# Patient Record
Sex: FEMALE | Race: ASIAN | NOT HISPANIC OR LATINO | Employment: FULL TIME | URBAN - METROPOLITAN AREA
[De-identification: names, ages, dates, MRNs, and addresses within clinical notes are randomized per-mention and may not be internally consistent; named-entity substitution may affect disease eponyms.]

---

## 2017-01-09 NOTE — PROGRESS NOTES
SUBJECTIVE:     CC: Katrina Barry is an 36 year old woman who presents for preventive health visit.     Healthy Habits:    Do you get at least three servings of calcium containing foods daily (dairy, green leafy vegetables, etc.)? yes    Amount of exercise or daily activities, outside of work:     Problems taking medications regularly not applicable    Medication side effects: No    Have you had an eye exam in the past two years? yes    Do you see a dentist twice per year? yes    Do you have sleep apnea, excessive snoring or daytime drowsiness?no    Pap smear done on this date: 4/2016 (approximately), by this group: in [place], results were negative.   Eye exam with ophthalmology on this date: 4/2016    [place] ago.    About a month ago had some lower abdominal pain and vaginal d/c.  Took an ABX (pcn?) that she had left from [place] and tylenol and felt better.  Now is having discharge again.  Also has chronic ear infections-- usually worse in right ear.  Water from shower will irritate symptoms. Has been ongoing since childhood.    PROBLEMS TO ADD ON...    Today's PHQ-2 Score:   PHQ-2 ( 1999 Pfizer) 1/10/2017   Q1: Little interest or pleasure in doing things 0   Q2: Feeling down, depressed or hopeless 0   PHQ-2 Score 0       Abuse: Current or Past(Physical, Sexual or Emotional)- No  Do you feel safe in your environment - Yes    Social History   Substance Use Topics     Smoking status: Never Smoker      Smokeless tobacco: Never Used     Alcohol Use: No     The patient does not drink >3 drinks per day nor >7 drinks per week.    No results for input(s): CHOL, HDL, LDL, TRIG, CHOLHDLRATIO, NHDL in the last 74092 hours.    Reviewed orders with patient.  Reviewed health maintenance and updated orders accordingly - Yes    Mammo Decision Support:  Mammogram not appropriate for this patient based on age.    Pertinent mammograms are reviewed under the imaging tab.  History of abnormal Pap smear: NO - age 30-  "65 PAP every 3 years recommended  All Histories reviewed and updated in Epic.      ROS:  C: NEGATIVE for fever, chills, change in weight  I: NEGATIVE for worrisome rashes, moles or lesions  E: NEGATIVE for vision changes or irritation  ENT: NEGATIVE for ear, mouth and throat problems  R: NEGATIVE for significant cough or SOB  B: NEGATIVE for masses, tenderness or discharge  CV: NEGATIVE for chest pain, palpitations or peripheral edema  GI: NEGATIVE for nausea, abdominal pain, heartburn, or change in bowel habits   female: vaginal discharge   M: NEGATIVE for significant arthralgias or myalgia  N: NEGATIVE for weakness, dizziness or paresthesias  P: NEGATIVE for changes in mood or affect    Problem list, Medication list, Allergies, and Medical/Social/Surgical histories reviewed in [facility] and updated as appropriate.  OBJECTIVE:     /72 mmHg  Pulse 68  Temp(Src) 98.3  F (36.8  C) (Oral)  Resp 16  Ht 5' 0.75\" (1.543 m)  Wt 116 lb (52.617 kg)  BMI 22.10 kg/m2  LMP 01/04/2017 (Exact Date)  Breastfeeding? No  EXAM:  GENERAL: healthy, alert and no distress  EYES: Eyes grossly normal to inspection, PERRL and conjunctivae and sclerae normal  HENT: normal cephalic/atraumatic, ear canals and TM's dull, nasal mucosa edematous, with enlarged turbinates bilateral , oropharynx clear and oral mucous membranes moist  NECK: no adenopathy, no asymmetry, masses, or scars and thyroid normal to palpation  RESP: lungs clear to auscultation - no rales, rhonchi or wheezes  BREAST: normal without masses, tenderness or nipple discharge and no palpable axillary masses or adenopathy  CV: regular rate and rhythm, normal S1 S2, no S3 or S4, no murmur, click or rub, no peripheral edema and peripheral pulses strong  ABDOMEN: soft, nontender, no hepatosplenomegaly, no masses and bowel sounds normal   (female): normal female external genitalia, normal urethral meatus, vaginal mucosa pink, moist, well rugated, and normal " "cervix/adnexa/uterus without masses-- yellow discharge present  MS: no gross musculoskeletal defects noted, no edema  SKIN: no suspicious lesions or rashes  NEURO: Normal strength and tone, mentation intact and speech normal  PSYCH: mentation appears normal, affect normal/bright    ASSESSMENT/PLAN:     1. Routine general medical examination at a health care facility    - LIPID REFLEX TO DIRECT LDL PANEL  - Basic metabolic panel  - TSH with free T4 reflex  - CBC with platelets    2. Cervical cancer screening    - Pap imaged thin layer screen with HPV - recommended age 30 - 65  - HPV High Risk Types DNA Cervical    3. Vaginal discharge    - Wet prep    4. Eustachian tube dysfunction, bilateral    - fluticasone (FLONASE) 50 MCG/ACT spray; Spray 1-2 sprays into both nostrils daily  Dispense: 1 Bottle; Refill: 11    5. Seasonal allergic rhinitis due to other allergic trigger    - cetirizine (ZYRTEC) 10 MG tablet; Take 1 tablet (10 mg) by mouth every evening  Dispense: 90 tablet; Refill: 3    COUNSELING:   Reviewed preventive health counseling, as reflected in patient instructions         reports that she has never smoked. She has never used smokeless tobacco.    Estimated body mass index is 22.1 kg/(m^2) as calculated from the following:    Height as of this encounter: 5' 0.75\" (1.543 m).    Weight as of this encounter: 116 lb (52.617 kg).       Counseling Resources:  ATP IV Guidelines  Pooled Cohorts Equation Calculator  Breast Cancer Risk Calculator  FRAX Risk Assessment  ICSI Preventive Guidelines  Dietary Guidelines for Americans, 2010  USDA's MyPlate  ASA Prophylaxis  Lung CA Screening    Roe Gibson PA-C  [facility]  "

## 2017-01-09 NOTE — PATIENT INSTRUCTIONS

## 2017-01-10 ENCOUNTER — OFFICE VISIT (OUTPATIENT)
Dept: FAMILY MEDICINE | Facility: CLINIC | Age: 37
End: 2017-01-10
Payer: COMMERCIAL

## 2017-01-10 VITALS
WEIGHT: 116 LBS | HEIGHT: 61 IN | HEART RATE: 68 BPM | BODY MASS INDEX: 21.9 KG/M2 | RESPIRATION RATE: 16 BRPM | TEMPERATURE: 98.3 F | SYSTOLIC BLOOD PRESSURE: 108 MMHG | DIASTOLIC BLOOD PRESSURE: 72 MMHG

## 2017-01-10 DIAGNOSIS — H69.93 EUSTACHIAN TUBE DYSFUNCTION, BILATERAL: ICD-10-CM

## 2017-01-10 DIAGNOSIS — Z00.00 ROUTINE GENERAL MEDICAL EXAMINATION AT A HEALTH CARE FACILITY: Primary | ICD-10-CM

## 2017-01-10 DIAGNOSIS — Z12.4 CERVICAL CANCER SCREENING: ICD-10-CM

## 2017-01-10 DIAGNOSIS — J30.89 SEASONAL ALLERGIC RHINITIS DUE TO OTHER ALLERGIC TRIGGER: ICD-10-CM

## 2017-01-10 DIAGNOSIS — N89.8 VAGINAL DISCHARGE: ICD-10-CM

## 2017-01-10 LAB
ERYTHROCYTE [DISTWIDTH] IN BLOOD BY AUTOMATED COUNT: 16 % (ref 10–15)
HCT VFR BLD AUTO: 36.9 % (ref 35–47)
HGB BLD-MCNC: 11.6 G/DL (ref 11.7–15.7)
MCH RBC QN AUTO: 20.6 PG (ref 26.5–33)
MCHC RBC AUTO-ENTMCNC: 31.4 G/DL (ref 31.5–36.5)
MCV RBC AUTO: 66 FL (ref 78–100)
MICRO REPORT STATUS: NORMAL
PLATELET # BLD AUTO: 238 10E9/L (ref 150–450)
RBC # BLD AUTO: 5.62 10E12/L (ref 3.8–5.2)
SPECIMEN SOURCE: NORMAL
WBC # BLD AUTO: 4.8 10E9/L (ref 4–11)
WET PREP SPEC: NORMAL

## 2017-01-10 PROCEDURE — 87624 HPV HI-RISK TYP POOLED RSLT: CPT | Performed by: PHYSICIAN ASSISTANT

## 2017-01-10 PROCEDURE — 85027 COMPLETE CBC AUTOMATED: CPT | Performed by: PHYSICIAN ASSISTANT

## 2017-01-10 PROCEDURE — 87210 SMEAR WET MOUNT SALINE/INK: CPT | Performed by: PHYSICIAN ASSISTANT

## 2017-01-10 PROCEDURE — 80061 LIPID PANEL: CPT | Performed by: PHYSICIAN ASSISTANT

## 2017-01-10 PROCEDURE — G0145 SCR C/V CYTO,THINLAYER,RESCR: HCPCS | Performed by: PHYSICIAN ASSISTANT

## 2017-01-10 PROCEDURE — 36415 COLL VENOUS BLD VENIPUNCTURE: CPT | Performed by: PHYSICIAN ASSISTANT

## 2017-01-10 PROCEDURE — 84443 ASSAY THYROID STIM HORMONE: CPT | Performed by: PHYSICIAN ASSISTANT

## 2017-01-10 PROCEDURE — 80048 BASIC METABOLIC PNL TOTAL CA: CPT | Performed by: PHYSICIAN ASSISTANT

## 2017-01-10 PROCEDURE — 99385 PREV VISIT NEW AGE 18-39: CPT | Performed by: PHYSICIAN ASSISTANT

## 2017-01-10 RX ORDER — FLUTICASONE PROPIONATE 50 MCG
1-2 SPRAY, SUSPENSION (ML) NASAL DAILY
Qty: 1 BOTTLE | Refills: 11 | Status: SHIPPED | OUTPATIENT
Start: 2017-01-10 | End: 2018-01-05

## 2017-01-10 RX ORDER — CETIRIZINE HYDROCHLORIDE 10 MG/1
10 TABLET ORAL EVERY EVENING
Qty: 90 TABLET | Refills: 3 | Status: SHIPPED | OUTPATIENT
Start: 2017-01-10 | End: 2018-01-05

## 2017-01-10 NOTE — Clinical Note
[address], [address]  [facility] [address]  [phone]      January 20, 2017    Katrina Barry  [address]  [facility] [address]    Dear Katrina,  We are happy to inform you that your PAP smear result from 01/10/17 is normal.  We are now able to do a follow up test on PAP smears. The DNA test is for HPV (Human Papilloma Virus). Cervical cancer is closely linked with certain types of HPV. Your result showed no evidence of high risk HPV.  Therefore we recommend you return in 3 years for your next pap smear.  You will still need to return to the clinic every year for an annual exam and other preventive tests.  Please contact the clinic with any questions.  Sincerely,  Roe Gibson PA-C/kostas

## 2017-01-10 NOTE — MR AVS SNAPSHOT
After Visit Summary   1/10/2017    Katrina Barry    MRN: [id number]           Patient Information     Date Of Birth          1980        Visit Information        Provider Department      1/10/2017 8:45 AM Roe Gibson PA-C; [employer] [facility]        Today's Diagnoses     Routine general medical examination at a health care facility    -  1     Cervical cancer screening         Vaginal discharge         Eustachian tube dysfunction, bilateral         Seasonal allergic rhinitis due to other allergic trigger           Care Instructions      Preventive Health Recommendations  Female Ages 26 - 39  Yearly exam:   See your health care provider every year in order to    Review health changes.     Discuss preventive care.      Review your medicines if you your doctor has prescribed any.    Until age 30: Get a Pap test every three years (more often if you have had an abnormal result).    After age 30: Talk to your doctor about whether you should have a Pap test every 3 years or have a Pap test with HPV screening every 5 years.   You do not need a Pap test if your uterus was removed (hysterectomy) and you have not had cancer.  You should be tested each year for STDs (sexually transmitted diseases), if you're at risk.   Talk to your provider about how often to have your cholesterol checked.  If you are at risk for diabetes, you should have a diabetes test (fasting glucose).  Shots: Get a flu shot each year. Get a tetanus shot every 10 years.   Nutrition:     Eat at least 5 servings of fruits and vegetables each day.    Eat whole-grain bread, whole-wheat pasta and brown rice instead of white grains and rice.    Talk to your provider about Calcium and Vitamin D.     Lifestyle    Exercise at least 150 minutes a week (30 minutes a day, 5 days of the week). This will help you control your weight and prevent disease.    Limit alcohol to one drink per day.    No smoking.  "    Wear sunscreen to prevent skin cancer.    See your dentist every six months for an exam and cleaning.          Eucerin- unscented lotion  For very dry skin- Aquphor (in tub with blue top)        Follow-ups after your visit        Follow-up notes from your care team     Return in about 1 year (around 1/10/2018) for Physical Exam.      Who to contact     If you have questions or need follow up information about today's clinic visit or your schedule please contact [facility] directly at [phone].  Normal or non-critical lab and imaging results will be communicated to you by KeepFuhart, letter or phone within 4 business days after the clinic has received the results. If you do not hear from us within 7 days, please contact the clinic through mktgt or phone. If you have a critical or abnormal lab result, we will notify you by phone as soon as possible.  Submit refill requests through [employer] or call your pharmacy and they will forward the refill request to us. Please allow 3 business days for your refill to be completed.          Additional Information About Your Visit        [employer] Information     [employer] lets you send messages to your doctor, view your test results, renew your prescriptions, schedule appointments and more. To sign up, go to [email or web address] . Click on \"Log in\" on the left side of the screen, which will take you to the Welcome page. Then click on \"Sign up Now\" on the right side of the page.     You will be asked to enter the access code listed below, as well as some personal information. Please follow the directions to create your username and password.     Your access code is: [email or web address]  Expires: 4/10/2017  9:56 AM     Your access code will  in 90 days. If you need help or a new code, please call your [facility] or [phone].        Care EveryWhere ID     This is your Care EveryWhere ID. This could be used by other organizations to access your [place] " "medical records  [id number]        Your Vitals Were     Pulse Temperature Respirations    68 98.3  F (36.8  C) (Oral) 16    Height BMI (Body Mass Index) Last Period    5' 0.75\" (1.543 m) 22.10 kg/m2 01/04/2017 (Exact Date)    Breastfeeding?          No         Blood Pressure from Last 3 Encounters:   01/10/17 108/72    Weight from Last 3 Encounters:   01/10/17 116 lb (52.617 kg)              We Performed the Following     Basic metabolic panel     CBC with platelets     HPV High Risk Types DNA Cervical     LIPID REFLEX TO DIRECT LDL PANEL     Pap imaged thin layer screen with HPV - recommended age 30 - 65     TSH with free T4 reflex     Wet prep          Today's Medication Changes          These changes are accurate as of: 1/10/17  9:56 AM.  If you have any questions, ask your nurse or doctor.               Start taking these medicines.        Dose/Directions    cetirizine 10 MG tablet   Commonly known as:  zyrTEC   Used for:  Seasonal allergic rhinitis due to other allergic trigger   Started by:  Roe Gibson PA-C        Dose:  10 mg   Take 1 tablet (10 mg) by mouth every evening   Quantity:  90 tablet   Refills:  3       fluticasone 50 MCG/ACT spray   Commonly known as:  FLONASE   Used for:  Eustachian tube dysfunction, bilateral   Started by:  Roe Gibson PA-C        Dose:  1-2 spray   Spray 1-2 sprays into both nostrils daily   Quantity:  1 Bottle   Refills:  11            Where to get your medicines      These medications were sent to [facility]/pharmacy #[id number] - [place], [place] - [address]  [address]   [address] [facility] [address]     Phone:  [phone]    - cetirizine 10 MG tablet  - fluticasone 50 MCG/ACT spray             Primary Care Provider Office Phone # Fax #    Roe Gibson PA-C [phone]       [facility] [id number] [facility] [address]        Thank you!     Thank you for choosing [facility]  for your care. " Our goal is always to provide you with excellent care. Hearing back from our patients is one way we can continue to improve our services. Please take a few minutes to complete the written survey that you may receive in the mail after your visit with us. Thank you!             Your Updated Medication List - Protect others around you: Learn how to safely use, store and throw away your medicines at www.disposemymeds.org.          This list is accurate as of: 1/10/17  9:56 AM.  Always use your most recent med list.                   Brand Name Dispense Instructions for use    cetirizine 10 MG tablet    zyrTEC    90 tablet    Take 1 tablet (10 mg) by mouth every evening       fluticasone 50 MCG/ACT spray    FLONASE    1 Bottle    Spray 1-2 sprays into both nostrils daily

## 2017-01-11 LAB
ANION GAP SERPL CALCULATED.3IONS-SCNC: 6 MMOL/L (ref 3–14)
BUN SERPL-MCNC: 17 MG/DL (ref 7–30)
CALCIUM SERPL-MCNC: 9 MG/DL (ref 8.5–10.1)
CHLORIDE SERPL-SCNC: 108 MMOL/L (ref 94–109)
CHOLEST SERPL-MCNC: 341 MG/DL
CO2 SERPL-SCNC: 27 MMOL/L (ref 20–32)
CREAT SERPL-MCNC: 0.51 MG/DL (ref 0.52–1.04)
GFR SERPL CREATININE-BSD FRML MDRD: ABNORMAL ML/MIN/1.7M2
GLUCOSE SERPL-MCNC: 101 MG/DL (ref 70–99)
HDLC SERPL-MCNC: 51 MG/DL
LDLC SERPL CALC-MCNC: 276 MG/DL
NONHDLC SERPL-MCNC: 290 MG/DL
POTASSIUM SERPL-SCNC: 4.2 MMOL/L (ref 3.4–5.3)
SODIUM SERPL-SCNC: 141 MMOL/L (ref 133–144)
TRIGL SERPL-MCNC: 69 MG/DL
TSH SERPL DL<=0.005 MIU/L-ACNC: 1.19 MU/L (ref 0.4–4)

## 2017-01-13 LAB
COPATH REPORT: NORMAL
PAP: NORMAL

## 2017-01-19 LAB
FINAL DIAGNOSIS: NORMAL
HPV HR 12 DNA CVX QL NAA+PROBE: NEGATIVE
HPV16 DNA SPEC QL NAA+PROBE: NEGATIVE
HPV18 DNA SPEC QL NAA+PROBE: NEGATIVE
SPECIMEN DESCRIPTION: NORMAL

## 2017-01-25 ENCOUNTER — TELEPHONE (OUTPATIENT)
Dept: FAMILY MEDICINE | Facility: CLINIC | Age: 37
End: 2017-01-25

## 2017-01-25 NOTE — TELEPHONE ENCOUNTER
Notes Recorded by Roe Gibson PA-C on 1/24/2017 at 3:50 PM  Can we call to see about having her come back to discuss labs?  Cholesterol is very high.  She is quite young so not sure medication is desirable for her but if family history is strong for heart disease we may need to address this.    I called patient's  Shantelle ([phone]) and left a detailed message on her voice mail to call the clinic back and schedule a follow up appointment with Roe to go over her lab results.    Shannon Wynn RN

## 2017-02-21 ENCOUNTER — TELEPHONE (OUTPATIENT)
Dept: FAMILY MEDICINE | Facility: CLINIC | Age: 37
End: 2017-02-21

## 2017-02-21 NOTE — TELEPHONE ENCOUNTER
Reason for call: Form   Our goal is to have forms completed within 72 hours, however some forms may require a visit or additional information.     Who is the form from? Patient  Where did the form come from? Patient or family brought in     What clinic location was the form placed at? [facility]  Where was the form placed? TC Desk  What number is listed as a contact on the form? none    Phone call message - patient request for a letter, form or note:     Date needed: To be addressed at next appt.  Patient will  at the clinic when completed  Has the patient signed a consent form for release of information? Not Applicable    Additional comments: Spouse has same papers to be filled out     Type of letter, form or note: medical    Phone Number Pt can be reached at: Home number on file [phone] (home)  Best Time: any  Can we leave a detailed message on this number? YES     Demond Ortega

## 2017-02-24 ENCOUNTER — OFFICE VISIT (OUTPATIENT)
Dept: FAMILY MEDICINE | Facility: CLINIC | Age: 37
End: 2017-02-24
Payer: COMMERCIAL

## 2017-02-24 VITALS
TEMPERATURE: 98.6 F | BODY MASS INDEX: 22.48 KG/M2 | WEIGHT: 118 LBS | DIASTOLIC BLOOD PRESSURE: 70 MMHG | HEART RATE: 82 BPM | RESPIRATION RATE: 20 BRPM | OXYGEN SATURATION: 99 % | SYSTOLIC BLOOD PRESSURE: 96 MMHG

## 2017-02-24 DIAGNOSIS — E78.5 ELEVATED LIPIDS: Primary | ICD-10-CM

## 2017-02-24 DIAGNOSIS — Z23 ENCOUNTER FOR VACCINATION: ICD-10-CM

## 2017-02-24 DIAGNOSIS — Z23 NEED FOR PROPHYLACTIC VACCINATION AND INOCULATION AGAINST INFLUENZA: ICD-10-CM

## 2017-02-24 PROCEDURE — 90707 MMR VACCINE SC: CPT | Performed by: PHYSICIAN ASSISTANT

## 2017-02-24 PROCEDURE — 90686 IIV4 VACC NO PRSV 0.5 ML IM: CPT | Performed by: PHYSICIAN ASSISTANT

## 2017-02-24 PROCEDURE — 90471 IMMUNIZATION ADMIN: CPT | Performed by: PHYSICIAN ASSISTANT

## 2017-02-24 PROCEDURE — 90472 IMMUNIZATION ADMIN EACH ADD: CPT | Performed by: PHYSICIAN ASSISTANT

## 2017-02-24 PROCEDURE — 99213 OFFICE O/P EST LOW 20 MIN: CPT | Mod: 25 | Performed by: PHYSICIAN ASSISTANT

## 2017-02-24 PROCEDURE — 90716 VAR VACCINE LIVE SUBQ: CPT | Performed by: PHYSICIAN ASSISTANT

## 2017-02-24 PROCEDURE — 90632 HEPA VACCINE ADULT IM: CPT | Performed by: PHYSICIAN ASSISTANT

## 2017-02-24 NOTE — NURSING NOTE
"Chief Complaint   Patient presents with     Lab Result Notice       Initial BP 96/70 (BP Location: Right arm, Patient Position: Chair, Cuff Size: Adult Regular)  Pulse 82  Temp 98.6  F (37  C) (Oral)  Resp 20  Wt 118 lb (53.5 kg)  SpO2 99%  BMI 22.48 kg/m2 Estimated body mass index is 22.48 kg/(m^2) as calculated from the following:    Height as of 1/10/17: 5' 0.75\" (1.543 m).    Weight as of this encounter: 118 lb (53.5 kg).  Medication Reconciliation: complete   Antionette Palma MA      "

## 2017-02-24 NOTE — PROGRESS NOTES
SUBJECTIVE:                                                    Katrina Barry is a 36 year old female who presents to clinic today for the following health issues:      Patient here to discuss recent lab results    Hyperlipidemia Follow-Up      Rate your low fat/cholesterol diet?: fair    Taking statin?  No    Other lipid medications/supplements?:  none    Recent Labs   Lab Test  01/10/17   1001   CHOL  341*   HDL  51   LDL  276*   TRIG  69     Katrina is here for follow up on elevated lipids. Reports she had elevated lipids in [place], but was not on any medications. Back in [place] was watching diet and exercising. Reports that for the past six months or so she has not been monitoring her diet at all. Thinks a level of 190 back in [place], unsure if total or LDL. She is not planning on having any more children. Does have IUD.        Additional problems:  Would also like to get vaccines updated.     Problem list and histories reviewed & adjusted, as indicated.  Additional history: as documented    BP Readings from Last 3 Encounters:   02/24/17 96/70   01/10/17 108/72    Wt Readings from Last 3 Encounters:   02/24/17 53.5 kg (118 lb)   01/10/17 52.6 kg (116 lb)                Labs reviewed in EPIC  Problem list, Medication list, Allergies, and Medical/Social/Surgical histories reviewed in [employer] and updated as appropriate.    ROS:  Constitutional, HEENT, cardiovascular, pulmonary, gi and gu systems are negative, except as otherwise noted.    This document serves as a record of the services and decisions personally performed and made by Roe Gibson PA-C. It was created on her behalf by Katya Bailey, a trained medical scribe. The creation of this document is based the provider's statements to the medical scribe.  Katya Bailey February 24, 2017 2:07 PM    OBJECTIVE:                                                    BP 96/70 (BP Location: Right arm, Patient Position: Chair, Cuff Size: Adult Regular)  Pulse 82  Temp 98.6   F (37  C) (Oral)  Resp 20  Wt 53.5 kg (118 lb)  SpO2 99%  BMI 22.48 kg/m2  Body mass index is 22.48 kg/(m^2).  GENERAL: healthy, alert and no distress  MS: no gross musculoskeletal defects noted, no edema  SKIN: no suspicious lesions or rashes  NEURO: Normal strength and tone, mentation intact and speech normal  PSYCH: mentation appears normal, affect normal/bright    Diagnostic Test Results:  none      ASSESSMENT/PLAN:                                                    1. Elevated lipids  Options discussed with patient. Will try to focus on a healthy diet for next six months and re-test. Also discussed that genetics could also be playing a role. Discussed options of medications, will re-address in six months. Discussed patient can not get pregnant while using statin medications but zetia could be an option.  She does have an IUD for birth control.  Not planning on children at this exact time.  Lipid future lab placed to have completed a few days before next appointment. Follow up in six months.   - Lipid Profile with reflex to direct LDL; Future  - NUTRITION REFERRAL    2. Encounter for vaccination  Update of vaccines. Recently moved to [place] from [place].   - HEPA/HEPB VACCINE ADULT IM  - FLU VACCINE, 3 YRS +, IM  - varicella virus vaccine, live, (VARIVAX) 1350 PFU/0.5ML INJ injection; Inject 0.5 mLs Subcutaneous once for 1 dose  Dispense: 0.5 mL; Refill: 0  - MMR VIRUS IMMUNIZATION, SUBCUT    The information in this document, created by the medical scribe for me, accurately reflects the services I personally performed and the decisions made by me. I have reviewed and approved this document for accuracy prior to leaving the patient care area.  2:07 PM 2/24/2017          Roe Gibson PA-C  [facility]    Injectable Influenza Immunization Documentation    1.  Is the person to be vaccinated sick today?  No    2. Does the person to be vaccinated have an allergy to eggs or to a  component of the vaccine?  No    3. Has the person to be vaccinated today ever had a serious reaction to influenza vaccine in the past?  No    4. Has the person to be vaccinated ever had Guillain-[place] syndrome?  No     Form completed by Antionette Palma MA

## 2017-02-24 NOTE — MR AVS SNAPSHOT
"              After Visit Summary   2/24/2017    Katrina Barry    MRN: [id number]           Patient Information     Date Of Birth          1980        Visit Information        Provider Department      2/24/2017 1:30 PM Roe Gibson PA-C; [employer] [facility]        Today's Diagnoses     Elevated lipids    -  1    Encounter for vaccination          Care Instructions      Ki?m soát l??ng Cholesterol c?a quý v?  Cholesterol là m?t ch?t gi?ng sáp.Nó di reji?n radha máu c?a quý v? thông guy các m?ch máu.Khi quý v? có hàm l??ng cholesterol oden, nó hình thành nên các vách radha m?ch máu.?i?u này khi?n các m?ch máu h?p h?n.L?u l??ng máu gi?m. Khi ?ó, r?i ro quý v? b? ?au josiah (heart attack) ho?c ??t qu? (stroke) s? l?n h?n.  Cholesterol t?t và x?u  Lipit là ch?t béo.Ph?n l?n thành ph?n c?a máu là n??c.Ch?t béo và n??c không hòa tan. Vì th?, c? th? c?a chúng ta c?n các lo?i lipoprotein (lipit bên radha m?t l?p v? protein) ?? abdon lipit. L?p v? protein abdon yjaaira lipit ?i guy dòng máu. Có bee lo?i lipoprotein chính:    Lipoprotein m?t ?? th?p (low-density lipoprotein, LDL) còn ???c bi?t ??n nh? là \"cholesterol x?u\". Nó ch? y?u abdon yajaira cholesterol. Nó d?n l??ng cholesterol này vào các t? bào c? th?. Colesterol d? th?a t? LDL s? hình thành các vách ??ng m?ch. ?i?u này làm t?ng r?i ro quý v? m?c b?nh josiah ho?c b? ??t qu?.    Lipoprotein m?t ?? cap (high-density lipoprotein, HDL) còn ???c bi?t ??n nh? là \"cholesterol t?t\". Nó ch? y?u là m?t l?p v? protein. L?p v? t?p h?p l??ng cholesterol d? th?a mà các LDL ?? l?i phía chetan trên các vách radha m?ch máu. ?ó là lý do t?i caroline m?c cholesterol HDL oden có th? gi?m r?i ro m?c b?nh josiah và ??t qu? c?a quý v?.  Ki?m soát m?c cholesterol  Cholesterol toàn ph?n laura g?m cholesterol LDL và HDL c?ng nh? các lo?i ch?t béo khác radha dòng máu. N?u cholesterol toàn ph?n c?a quý v? oden, hãy làm yajaira các b??c bên d??i ?? giúp gi?m m?c cholesterol toàn " ph?n c?a quý v?.    ?n ít ch?t béo không có l?i cho s?c kh?e    Gi?m l??ng ch?t béo ba?o ho?a và ch?t béo chuyê?n ho?a (còn g?i là ch?t béo ?ã hi?ro hóa) b??ng ca?ch l??a cho?n th?t n?c, s?a ít béo và s? d?ng các lo?i d?u thay cho ch?t béo da?ng r??n. Ha?n chê? ca?c sa?n phâ?m n???ng, th?t ch? bi?n và các lo?i th?c ph?m chiên. M?t ch? ?? ?n giàu hàm l??ng các lo?i ch?t béo này làm t?ng l??ng cholesterol x?u c?a quý v?. Ch? gi?m th?c ?n ch?a cholesterol không thôi là ch?a ??.    Hãy ?n anderson?ng 2 b?a cá m?i tu?n. H?u h?t cá ??u ch?a các lo?i axit béo omega-3 (omega-3 fatty acid). Các lo?i axit này giúp gi?m l??ng cholesterol radha máu.    ?n emani?u th?c ?n toàn kathryn b?t ho?c s?i có th? hòa tan (ch?ng h?n nh? cám y?n m?ch). Nh?ng lo?i th?c ?n này giúp h? th?p l??ng cholesterol toàn b?.    N?ng t?p luy?n    Ch?n m?t ho?t ??ng mà quý v? yêu thích. ?i b?, b?i và ?i xe ??p là m?t vài cách t?t ?? t?p luy?n.    B?t ??u ? m?c ?? mà quý v? c?m th?y tho?i mái. T?ng th?i angely và nh?p ?? c?a quý v? m?t chút chetan m?i tu?n.    Patricia?t ?ô?ng thê? châ?t t?i ?a 40 phút v??i c???ng ?ô? t?? v??a ?ê?n oden tô?i thiê?u 3 ?ê?n 4 natalia?y mô?i tuâ?n.    Hãy nh? r?ng có t?p luy?n còn h?n không.    N?u quý v? ch?a t?ng t?p luy?n ??u ??n, hãy b?t ??u m?t cách ch?m ch?m. Ki?m tra v?i bác s? c?a quý v? ?? ch?c ch?n r?ng k? ho?ch t?p luy?n phù h?p ??i v?i quý v?.    B? hút thu?c: B? hút thu?c có th? c?i thi?n l??ng lipit c?a quý v?. Nó c?ng làm gi?m r?i ro m?c b?nh josiah và ??t qu? c?a quý v?.    Qu?n lý cân n??ng. N?u yesi? vi? ?ang th?a cân ho?c béo phì, nhà cung c?p ch?m sóc s?c kh?e c?a yesi? vi? s? làm vi?c v?i yesi? vi? ?? gi?m cân và gi?m ch? s? BMI (ch? s? kh?i c? th?) cu?a yesi? vi? ??n m?t m?c ?? bình th??ng ho?c g?n v??i bình th??ng. Thay ??i ch? ?? ?n u?ng và t?ng ho?t ??ng th? ch?t có th? h??u i?ch.      U?ng thu?c yajaira ch? d?n: Có emani?u ng??i ???c nh?n thu?c ?? ??a m?c LDL c?a h? v? ng??ng an toàn. Thu?c dùng ?? gi?m m?c cholesterol là lo?i hi?u  qu? và an toàn. (Nh?ng u?ng thu?c không ph?i là hình th?c thay th? cho t?p luy?n ho?c tuân th? ch? ?? ?n kiêng c?a quý v?!) Bác s? c?a quý v? có th? cho quý v? bi?t li?u m?t lo?i thu?c h? cholesterol nào ?ó có l?i cho quý v? hay không.    [id number] [employer] [facility]. [address] [place] [address]. All rights reserved. This information is not intended as a substitute for professional medical care. Always follow your healthcare professional's instructions.            Keeping Healthy: Taking Care of Your Cholesterol:       Cholesterol is a fatty substance in your body. Cholesterol can be both helpful and harmful to your body. On the good side, it helps build the hormones and nerve cells your body needs. But when you have too much cholesterol, the walls of your blood vessels can thicken. This can cause heart attacks and strokes.     Measuring Cholesterol   When you get your cholesterol checked, your health care provider will tell you how high your HDL (Good) and your LDL (Bad) cholesterol is:     HDL helps prevent heart disease. It helps your body get rid of cholesterol.   LDL leaves fat on the inside of the blood vessels. When you have too much LDL, you have a higher chance of heart disease.   It's good to have high HDL and low LDL.     If your HDL is   45 or above, that is healthy.   35 to 44, it is a little too low.   35 or below, that is not healthy.     If your LDL is:   130 or below, that is healthy.   130 to 159, that is a little too high.   160 or above, that is not healthy.     Keeping Your Cholesterol Low   Most of the time, you can take care of your cholesterol by eating right and getting the exercise you need.     It's important to eat healthy foods to keep a healthy weight.   Overall watch your portion size--too much of any food whether it is a healthy food or not will cause weight gain and will increase your fats in your blood.  Eat fruits, vegetables, beans, and whole grains every day.    Eat less fat. Stay away from saturated fat, like that in butter and meat.   Use oils like sunflower, safflower, canola, olive, or corn. Stay away from palm or coconut oil.   Eat chicken, turkey, and fish instead of red meat.   It also helps to:     Check food labels for fat and cholesterol.   Take the skin off before you eat chicken or turkey.   Drink skim milk instead of whole milk.   Use low-fat yogurt or cottage cheese instead of sour cream.   Follow your health care provider's advice for exercise.     You may want to swim, jog, walk, or bicycle.   You should exercise at least 20 minutes everyday.   High cholesterol may be a problem in your family. Know your family history. Talk about it with your health care provider.     Remember, to take care of your cholesterol:     Eat healthy.   Exercise often.           Medlineplus= approved website to look up your health!!            Follow-ups after your visit        Additional Services     NUTRITION REFERRAL       Your provider has referred you to: [facility]: [facility] [phone]   [email or web address]    Please be aware that coverage of these services is subject to the terms and limitations of your health insurance plan.  Call member services at your health plan with any benefit or coverage questions.      Please bring the following with you to your appointment:    (1) This referral request  (2) Any documents given to you regarding this referral  (3) Any specific questions you have about diet and/or food choices                  Follow-up notes from your care team     Return in about 6 months (around 8/24/2017) for Lab Work.      Future tests that were ordered for you today     Open Future Orders        Priority Expected Expires Ordered    Lipid Profile with reflex to direct LDL Routine  2/24/2018 2/24/2017            Who to contact     If you have questions or need follow up information about today's clinic visit or  "your schedule please contact [facility] directly at [phone].  Normal or non-critical lab and imaging results will be communicated to you by MyChart, letter or phone within 4 business days after the clinic has received the results. If you do not hear from us within 7 days, please contact the clinic through [employer] or phone. If you have a critical or abnormal lab result, we will notify you by phone as soon as possible.  Submit refill requests through [employer] or call your pharmacy and they will forward the refill request to us. Please allow 3 business days for your refill to be completed.          Additional Information About Your Visit        [employer] Information     [employer] lets you send messages to your doctor, view your test results, renew your prescriptions, schedule appointments and more. To sign up, go to [email or web address] . Click on \"Log in\" on the left side of the screen, which will take you to the Welcome page. Then click on \"Sign up Now\" on the right side of the page.     You will be asked to enter the access code listed below, as well as some personal information. Please follow the directions to create your username and password.     Your access code is: [email or web address]  Expires: 4/10/2017  9:56 AM     Your access code will  in 90 days. If you need help or a new code, please call your [facility] clinic or [phone].        Care EveryWhere ID     This is your Care EveryWhere ID. This could be used by other organizations to access your [facility] medical records  [id number]        Your Vitals Were     Pulse Temperature Respirations Pulse Oximetry BMI (Body Mass Index)       82 98.6  F (37  C) (Oral) 20 99% 22.48 kg/m2        Blood Pressure from Last 3 Encounters:   17 96/70   01/10/17 108/72    Weight from Last 3 Encounters:   17 118 lb (53.5 kg)   01/10/17 116 lb (52.6 kg)              We Performed the Following     FLU VACCINE, 3 YRS +, IM     HEPA/HEPB VACCINE " ADULT IM     MMR VIRUS IMMUNIZATION, SUBCUT     NUTRITION REFERRAL          Today's Medication Changes          These changes are accurate as of: 2/24/17  2:41 PM.  If you have any questions, ask your nurse or doctor.               Start taking these medicines.        Dose/Directions    varicella virus vaccine (live) 1350 PFU/0.5ML Inj injection   Commonly known as:  VARIVAX   Used for:  Encounter for vaccination   Started by:  Roe Gibson PA-C        Dose:  0.5 mL   Inject 0.5 mLs Subcutaneous once for 1 dose   Quantity:  0.5 mL   Refills:  0            Where to get your medicines      These medications were sent to [facility]/pharmacy #[id number] - [place], [place] - [address]  [facility]  [address] [facility] [address]     Phone:  [phone]     varicella virus vaccine (live) 1350 PFU/0.5ML Inj injection                Primary Care Provider Office Phone # Fax #    Roe Gibson PA-C [phone]       [facility] [address] [facility] [address]        Thank you!     Thank you for choosing [facility]  for your care. Our goal is always to provide you with excellent care. Hearing back from our patients is one way we can continue to improve our services. Please take a few minutes to complete the written survey that you may receive in the mail after your visit with us. Thank you!             Your Updated Medication List - Protect others around you: Learn how to safely use, store and throw away your medicines at www.disposemymeds.org.          This list is accurate as of: 2/24/17  2:41 PM.  Always use your most recent med list.                   Brand Name Dispense Instructions for use    cetirizine 10 MG tablet    zyrTEC    90 tablet    Take 1 tablet (10 mg) by mouth every evening       fluticasone 50 MCG/ACT spray    FLONASE    1 Bottle    Spray 1-2 sprays into both nostrils daily       varicella virus vaccine (live) 1350 PFU/0.5ML Inj injection     VARIVAX    0.5 mL    Inject 0.5 mLs Subcutaneous once for 1 dose

## 2017-02-24 NOTE — PATIENT INSTRUCTIONS
"  Ki?m soát l??ng Cholesterol c?a quý v?  Cholesterol là m?t ch?t gi?ng sáp.Nó di reji?n radha máu c?a quý v? thông guy các m?ch máu.Khi quý v? có hàm l??ng cholesterol oden, nó hình thành nên các vách radha m?ch máu.?i?u này khi?n các m?ch máu h?p h?n.L?u l??ng máu gi?m. Khi ?ó, r?i ro quý v? b? ?au josiah (heart attack) ho?c ??t qu? (stroke) s? l?n h?n.  Cholesterol t?t và x?u  Lipit là ch?t béo.Ph?n l?n thành ph?n c?a máu là n??c.Ch?t béo và n??c không hòa tan. Vì th?, c? th? c?a jazlyn ta c?n các lo?i lipoprotein (lipit bên radha m?t l?p v? protein) ?? abdon lipit. L?p v? protein abdon yajaira lipit ?i guy dòng máu. Có bee lo?i lipoprotein chính:    Lipoprotein m?t ?? th?p (low-density lipoprotein, LDL) còn ???c bi?t ??n nh? là \"cholesterol x?u\". Nó ch? y?u abdon yajaira cholesterol. Nó d?n l??ng cholesterol này vào các t? bào c? th?. Colesterol d? th?a t? LDL s? hình thành các vách ??ng m?ch. ?i?u này làm t?ng r?i ro quý v? m?c b?nh josiah ho?c b? ??t qu?.    Lipoprotein m?t ?? cap (high-density lipoprotein, HDL) còn ???c bi?t ??n nh? là \"cholesterol t?t\". Nó ch? y?u là m?t l?p v? protein. L?p v? t?p h?p l??ng cholesterol d? th?a mà các LDL ?? l?i phía chetan trên các vách radha m?ch máu. ?ó là lý do t?i caroline m?c cholesterol HDL oden có th? gi?m r?i ro m?c b?nh josiah và ??t qu? c?a quý v?.  Ki?m soát m?c cholesterol  Cholesterol toàn ph?n laura g?m cholesterol LDL và HDL c?ng nh? các lo?i ch?t béo khác radha dòng máu. N?u cholesterol toàn ph?n c?a quý v? oden, hãy làm yajaira các b??c bên d??i ?? giúp gi?m m?c cholesterol toàn ph?n c?a quý v?.    ?n ít ch?t béo không có l?i cho s?c kh?e    Gi?m l??ng ch?t béo ba?o ho?a và ch?t béo chuyê?n ho?a (còn g?i là ch?t béo ?ã hi?ro hóa) b??ng ca?ch l??a cho?n th?t n?c, s?a ít béo và s? d?ng các lo?i d?u thay cho ch?t béo da?ng r??n. Ha?n chê? ca?c sa?n phâ?m n???ng, th?t ch? bi?n và các lo?i th?c ph?m srikanth. M?t ch? ?? ?n giàu hàm l??ng các lo?i ch?t béo này làm t?ng l??ng cholesterol x?u c?a quý " v?. Ch? gi?m th?c ?n ch?a cholesterol không thôi là ch?a ??.    Hãy ?n anderson?ng 2 b?a cá m?i tu?n. H?u h?t cá ??u ch?a các lo?i axit béo omega-3 (omega-3 fatty acid). Các lo?i axit này giúp gi?m l??ng cholesterol radha máu.    ?n emani?u th?c ?n toàn kathryn b?t ho?c s?i có th? hòa tan (ch?ng h?n nh? cám y?n m?ch). Nh?ng lo?i th?c ?n này giúp h? th?p l??ng cholesterol toàn b?.    N?ng t?p luy?n    Ch?n m?t ho?t ??ng mà quý v? yêu thích. ?i b?, b?i và ?i xe ??p là m?t vài cách t?t ?? t?p luy?n.    B?t ??u ? m?c ?? mà quý v? c?m th?y tho?i mái. T?ng th?i angely và nh?p ?? c?a quý v? m?t chút chetan m?i tu?n.    Patricia?t ?ô?ng thê? châ?t t?i ?a 40 phút v??i c???ng ?ô? t?? v??a ?ê?n oden tô?i thiê?u 3 ?ê?n 4 natalia?y mô?i tuâ?n.    Hãy nh? r?ng có t?p luy?n còn h?n không.    N?u quý v? ch?a t?ng t?p luy?n ??u ??n, hãy b?t ??u m?t cách ch?m ch?m. Ki?m tra v?i bác s? c?a quý v? ?? ch?c ch?n r?ng k? ho?ch t?p luy?n phù h?p ??i v?i quý v?.    B? hút thu?c: B? hút thu?c có th? c?i thi?n l??ng lipit c?a quý v?. Nó c?ng làm gi?m r?i ro m?c b?nh josiah và ??t qu? c?a quý v?.    Qu?n lý cân n??ng. N?u yesi? vi? ?ang th?a cân ho?c béo phì, nhà cung c?p ch?m sóc s?c kh?e c?a yesi? vi? s? làm vi?c v?i yesi? vi? ?? gi?m cân và gi?m ch? s? BMI (ch? s? kh?i c? th?) cu?a yesi? vi? ??n m?t m?c ?? bình th??ng ho?c g?n v??i bình th??ng. Thay ??i ch? ?? ?n u?ng và t?ng ho?t ??ng th? ch?t có th? h??u i?ch.      U?ng thu?c yajaira ch? d?n: Có emani?u ng??i ???c nh?n thu?c ?? ??a m?c LDL c?a h? v? ng??ng an toàn. Thu?c dùng ?? gi?m m?c cholesterol là lo?i hi?u qu? và an toàn. (Nh?ng u?ng thu?c không ph?i là hình th?c thay th? cho t?p luy?n ho?c tuân th? ch? ?? ?n kiêng c?a quý v?!) Bác s? c?a quý v? có th? cho quý v? bi?t li?u m?t lo?i thu?c h? cholesterol nào ?ó có l?i cho quý v? hay không.    [id number] [employer]. [address] [place] [address], [place] [address]. All rights reserved. This information is not intended as a substitute for professional medical care.  Always follow your healthcare professional's instructions.            Keeping Healthy: Taking Care of Your Cholesterol:       Cholesterol is a fatty substance in your body. Cholesterol can be both helpful and harmful to your body. On the good side, it helps build the hormones and nerve cells your body needs. But when you have too much cholesterol, the walls of your blood vessels can thicken. This can cause heart attacks and strokes.     Measuring Cholesterol   When you get your cholesterol checked, your health care provider will tell you how high your HDL (Good) and your LDL (Bad) cholesterol is:     HDL helps prevent heart disease. It helps your body get rid of cholesterol.   LDL leaves fat on the inside of the blood vessels. When you have too much LDL, you have a higher chance of heart disease.   It's good to have high HDL and low LDL.     If your HDL is   45 or above, that is healthy.   35 to 44, it is a little too low.   35 or below, that is not healthy.     If your LDL is:   130 or below, that is healthy.   130 to 159, that is a little too high.   160 or above, that is not healthy.     Keeping Your Cholesterol Low   Most of the time, you can take care of your cholesterol by eating right and getting the exercise you need.     It's important to eat healthy foods to keep a healthy weight.   Overall watch your portion size--too much of any food whether it is a healthy food or not will cause weight gain and will increase your fats in your blood.  Eat fruits, vegetables, beans, and whole grains every day.   Eat less fat. Stay away from saturated fat, like that in butter and meat.   Use oils like sunflower, safflower, canola, olive, or corn. Stay away from palm or coconut oil.   Eat chicken, turkey, and fish instead of red meat.   It also helps to:     Check food labels for fat and cholesterol.   Take the skin off before you eat chicken or turkey.   Drink skim milk instead of whole milk.   Use low-fat yogurt or  cottage cheese instead of sour cream.   Follow your health care provider's advice for exercise.     You may want to swim, jog, walk, or bicycle.   You should exercise at least 20 minutes everyday.   High cholesterol may be a problem in your family. Know your family history. Talk about it with your health care provider.     Remember, to take care of your cholesterol:     Eat healthy.   Exercise often.           Medlineplus= approved website to look up your health!!

## 2017-02-24 NOTE — NURSING NOTE
Screening Questionnaire for Adult Immunization    Are you sick today?   No   Do you have allergies to medications, food, a vaccine component or latex?   No   Have you ever had a serious reaction after receiving a vaccination?   No   Do you have a long-term health problem with heart disease, lung disease, asthma, kidney disease, metabolic disease (e.g. diabetes), anemia, or other blood disorder?   No   Do you have cancer, leukemia, HIV/AIDS, or any other immune system problem?   No   In the past 3 months, have you taken medications that affect  your immune system, such as prednisone, other steroids, or anticancer drugs; drugs for the treatment of rheumatoid arthritis, Crohn s disease, or psoriasis; or have you had radiation treatments?   No   Have you had a seizure, or a brain or other nervous system problem?   No   During the past year, have you received a transfusion of blood or blood     products, or been given immune (gamma) globulin or antiviral drug?   No   For women: Are you pregnant or is there a chance you could become        pregnant during the next month?   No   Have you received any vaccinations in the past 4 weeks?   No     Immunization questionnaire answers were all negative.      MNVFC doesn't apply on this patient    Per orders of RUPINDER Kirby, injection of MMR, Varicella, Hep A, Influenza given by Antionette Palma. Patient instructed to remain in clinic for 20 minutes afterwards, and to report any adverse reaction to me immediately.       Screening performed by Antionette Palma on 2/24/2017 at 2:54 PM.

## 2017-03-23 ENCOUNTER — OFFICE VISIT (OUTPATIENT)
Dept: NUTRITION | Facility: CLINIC | Age: 37
End: 2017-03-23
Attending: PHYSICIAN ASSISTANT
Payer: COMMERCIAL

## 2017-03-23 DIAGNOSIS — E78.00 HIGH BLOOD CHOLESTEROL: Primary | ICD-10-CM

## 2017-03-23 PROCEDURE — 97802 MEDICAL NUTRITION INDIV IN: CPT

## 2017-03-23 NOTE — MR AVS SNAPSHOT
After Visit Summary   3/23/2017    Katrina Barry    MRN: [id number]           Patient Information     Date Of Birth          1980        Visit Information        Provider Department      3/23/2017 2:15 PM [facility] [employer]; [place] NUTRITION RESOURCE [facility]        Today's Diagnoses     High blood cholesterol    -  1      Care Instructions    1.         Follow-ups after your visit        Your next 10 appointments already scheduled     Aug 21, 2017  8:00 AM CDT   LAB with FM LAB   [facility]    [address] [place] [address], [address] [address] [phone]           Patient must bring picture ID.  Patient should be prepared to give a urine specimen  Please do not eat 10-12 hours before your appointment if you are coming in fasting for labs on lipids, cholesterol, or glucose (sugar).  Pregnant women should follow their Care Team instructions. Water with medications is okay. Do not drink coffee or other fluids.   If you have concerns about taking  your medications, please ask at office or if scheduling via [employer], send a message by clicking on Secure Messaging, Message Your Care Team.            Aug 24, 2017  8:00 AM CDT   SHORT with Roe Gibson PA-C   [facility]    [address] [place] [address], [address] [place] [address] [phone]              Who to contact     If you have questions or need follow up information about today's clinic visit or your schedule please contact [facility] directly at [phone].  Normal or non-critical lab and imaging results will be communicated to you by MyChart, letter or phone within 4 business days after the clinic has received the results. If you do not hear from us within 7 days, please contact the clinic through MyChart or phone. If you have a critical or abnormal lab result, we will notify you by phone  "as soon as possible.  Submit refill requests through [facility] or call your pharmacy and they will forward the refill request to us. Please allow 3 business days for your refill to be completed.          Additional Information About Your Visit        [employer] Information     [facility] lets you send messages to your doctor, view your test results, renew your prescriptions, schedule appointments and more. To sign up, go to [email or web address] . Click on \"Log in\" on the left side of the screen, which will take you to the Welcome page. Then click on \"Sign up Now\" on the right side of the page.     You will be asked to enter the access code listed below, as well as some personal information. Please follow the directions to create your username and password.     Your access code is: [email or web address]  Expires: 4/10/2017 10:56 AM     Your access code will  in 90 days. If you need help or a new code, please call your [place] clinic or [phone].        Care EveryWhere ID     This is your Care EveryWhere ID. This could be used by other organizations to access your [place] medical records  [id number]         Blood Pressure from Last 3 Encounters:   17 96/70   01/10/17 108/72    Weight from Last 3 Encounters:   17 53.5 kg (118 lb)   01/10/17 52.6 kg (116 lb)              We Performed the Following     MNT INDIVIDUAL INITIAL EA 15 MIN        Primary Care Provider Office Phone # Fax #    Roe Gibson PA-C [phone]       [facility] [address]  [facility] [address]        Thank you!     Thank you for choosing [facility]  for your care. Our goal is always to provide you with excellent care. Hearing back from our patients is one way we can continue to improve our services. Please take a few minutes to complete the written survey that you may receive in the mail after your visit with us. Thank you!             Your Updated Medication List - Protect " others around you: Learn how to safely use, store and throw away your medicines at www.disposemymeds.org.          This list is accurate as of: 3/23/17  2:58 PM.  Always use your most recent med list.                   Brand Name Dispense Instructions for use    cetirizine 10 MG tablet    zyrTEC    90 tablet    Take 1 tablet (10 mg) by mouth every evening       fluticasone 50 MCG/ACT spray    FLONASE    1 Bottle    Spray 1-2 sprays into both nostrils daily

## 2017-03-23 NOTE — PROGRESS NOTES
Medical Nutrition Therapy  Visit Type:Initial assessment and intervention    Katrina Barry presents today for MNT and education related to weight management and hyperlipidemia.   She is accompanied by .     ASSESSMENT:   Patient comments/concerns relating to nutrition: needs to get cholesterol down    New to [place] from [place].   Skin and fat are still on the meat that she cooks (chicken feet, etc). But she eats very little.     NUTRITION HISTORY:  Was drinking soda but stopped - 4 weeks ago quit    Breakfast: Pizza 1 slice ([employer]- pepperoni, sausage) or Sinhala soup (<1 cup) bone broth -- rice noodles, meat (pork or chicken), beef or shrimp)  snack - fruit  Lunch: rice (1/2 cup - white), 1/2 cup vegetable, protein (pork and/or fish)  Snack- fruits (fresh)  Dinner: same as lunch, along with fat free milk  Snacks: just as above  Beverages: water    Misses meals? no  Eats out:  never     Previous diet education:  No     Food allergies/intolerances: no    Diet is high in: fat (saturated) and vegetables  Diet is low in: calories    EXERCISE: Active at least 30 minutes most days     SOCIO/ECONOMIC:   Lives with: mother and father    MEDICATIONS:  Current Outpatient Prescriptions   Medication     fluticasone (FLONASE) 50 MCG/ACT spray     cetirizine (ZYRTEC) 10 MG tablet     No current facility-administered medications for this visit.        LABS:  Last Basic Metabolic Panel:  Lab Results   Component Value Date     01/10/2017      Lab Results   Component Value Date    POTASSIUM 4.2 01/10/2017     Lab Results   Component Value Date    CHLORIDE 108 01/10/2017     Lab Results   Component Value Date    CHEN 9.0 01/10/2017     Lab Results   Component Value Date    CO2 27 01/10/2017     Lab Results   Component Value Date    BUN 17 01/10/2017     Lab Results   Component Value Date    CR 0.51 01/10/2017     Lab Results   Component Value Date     01/10/2017       ANTHROPOMETRICS:  Vitals: There were no vitals  taken for this visit.  There is no height or weight on file to calculate BMI.      Wt Readings from Last 5 Encounters:   02/24/17 53.5 kg (118 lb)   01/10/17 52.6 kg (116 lb)       Weight Change: stable    NUTRITION DIAGNOSIS: Food- and nutrition-related knowledge deficit related to no prior formal education on heart health as evidenced by patient report    NUTRITION INTERVENTION:  Education given to support: general nutrition guidelines and heart healthy diet  Motivational Interviewing    PATIENT'S BEHAVIOR CHANGE GOALS:   1. Cut off visible fat on meat  2. Have pork/chicken feet less often  3. Try ot have a more veggie filled pizza rather than meat  4. Aim for more vegetables, and change to brown rice    MONITOR / EVALUATE:  RD will monitor/evaluate:  Food / Beverage / Nutrient intake   Pertinent Labs    FOLLOW-UP:  Follow up with RD as needed.    ALIREZA Farr CDE  Time spent in minutes: 30  Encounter: Individual

## 2018-01-05 ENCOUNTER — OFFICE VISIT (OUTPATIENT)
Dept: FAMILY MEDICINE | Facility: CLINIC | Age: 38
End: 2018-01-05
Payer: COMMERCIAL

## 2018-01-05 VITALS
WEIGHT: 116 LBS | RESPIRATION RATE: 12 BRPM | BODY MASS INDEX: 21.9 KG/M2 | SYSTOLIC BLOOD PRESSURE: 104 MMHG | HEART RATE: 64 BPM | DIASTOLIC BLOOD PRESSURE: 78 MMHG | HEIGHT: 61 IN | TEMPERATURE: 98.1 F

## 2018-01-05 DIAGNOSIS — E78.5 ELEVATED LIPIDS: ICD-10-CM

## 2018-01-05 DIAGNOSIS — H69.93 EUSTACHIAN TUBE DYSFUNCTION, BILATERAL: ICD-10-CM

## 2018-01-05 DIAGNOSIS — E78.2 MIXED HYPERLIPIDEMIA: Primary | ICD-10-CM

## 2018-01-05 PROCEDURE — 99213 OFFICE O/P EST LOW 20 MIN: CPT | Performed by: PHYSICIAN ASSISTANT

## 2018-01-05 PROCEDURE — 80061 LIPID PANEL: CPT | Performed by: PHYSICIAN ASSISTANT

## 2018-01-05 PROCEDURE — 36415 COLL VENOUS BLD VENIPUNCTURE: CPT | Performed by: PHYSICIAN ASSISTANT

## 2018-01-05 RX ORDER — FLUTICASONE PROPIONATE 50 MCG
1-2 SPRAY, SUSPENSION (ML) NASAL DAILY
Qty: 1 BOTTLE | Refills: 11 | Status: SHIPPED | OUTPATIENT
Start: 2018-01-05 | End: 2023-02-07

## 2018-01-05 RX ORDER — CETIRIZINE HYDROCHLORIDE 10 MG/1
10 TABLET ORAL EVERY EVENING
Qty: 90 TABLET | Refills: 3 | Status: SHIPPED | OUTPATIENT
Start: 2018-01-05 | End: 2023-02-07

## 2018-01-05 NOTE — PROGRESS NOTES
"  SUBJECTIVE:   Katrina Barry is a 37 year old female who presents to clinic today for the following health issues:      Hyperlipidemia Follow-Up      Rate your low fat/cholesterol diet?: good    Taking statin?  No    Other lipid medications/supplements?:  none        Amount of exercise or physical activity: 6-7 days/week for an average of less than 15 minutes    Problems taking medications regularly: NA    Medication side effects: not applicable    Diet: low fat/cholesterol    Has been following diet plan best she can.  She knew in [place] right when she was leaving for [place] that her cholesterol was high but it was not this high.  Then she came here and started eating an American diet and apparently it has worsened.  She is feeling well.  Requests a refill of medications for her ears.  They are doing OK, still some discomfort on and off.      Problem list and histories reviewed & adjusted, as indicated.  Additional history: as documented    Labs reviewed in EPIC    Reviewed and updated as needed this visit by clinical staffTobacco  Allergies  Meds  Problems  Med Hx  Surg Hx  Fam Hx  Soc Hx        Reviewed and updated as needed this visit by Provider         ROS:  Constitutional, HEENT, cardiovascular, pulmonary, gi and gu systems are negative, except as otherwise noted.      OBJECTIVE:   /78 (BP Location: Right arm, Patient Position: Sitting, Cuff Size: Adult Regular)  Pulse 64  Temp 98.1  F (36.7  C) (Oral)  Resp 12  Ht 5' 0.75\" (1.543 m)  Wt 116 lb (52.6 kg)  BMI 22.1 kg/m2  Body mass index is 22.1 kg/(m^2).  GENERAL: healthy, alert and no distress  ENT: TM's are normal today  MS: no gross musculoskeletal defects noted, no edema  SKIN: no suspicious lesions or rashes  PSYCH: mentation appears normal, affect normal/bright    Diagnostic Test Results:  Results for orders placed or performed in visit on 01/05/18   Lipid Profile with reflex to direct LDL   Result Value Ref Range    Cholesterol 275 (H) " <200 mg/dL    Triglycerides 153 (H) <150 mg/dL    HDL Cholesterol 45 (L) >49 mg/dL    LDL Cholesterol Calculated 199 (H) <100 mg/dL    Non HDL Cholesterol 230 (H) <130 mg/dL       ASSESSMENT/PLAN:   1. Mixed hyperlipidemia  - likely will need to go on Lipitor based on lab results.    2. Eustachian tube dysfunction, bilateral    - fluticasone (FLONASE) 50 MCG/ACT spray; Spray 1-2 sprays into both nostrils daily  Dispense: 1 Bottle; Refill: 11  - cetirizine (ZYRTEC) 10 MG tablet; Take 1 tablet (10 mg) by mouth every evening  Dispense: 90 tablet; Refill: 3    3. Elevated lipids  Cholesterol results did go down some but LDL is still very high, nearly 200.  She is still of childbearing ago but has an IUD.  - Lipid Profile with reflex to direct LDL        Roe Gibson PA-C  [facility]

## 2018-01-05 NOTE — MR AVS SNAPSHOT
"              After Visit Summary   2018    Katrina Barry    MRN: [id number]           Patient Information     Date Of Birth          1980        Visit Information        Provider Department      2018 11:15 AM Roe Gibson PA-C; [facility]        Today's Diagnoses     Mixed hyperlipidemia    -  1    Eustachian tube dysfunction, bilateral           Follow-ups after your visit        Follow-up notes from your care team     Return in about 1 week (around 2018).      Who to contact     If you have questions or need follow up information about today's clinic visit or your schedule please contact [facility] directly at [phone].  Normal or non-critical lab and imaging results will be communicated to you by MyChart, letter or phone within 4 business days after the clinic has received the results. If you do not hear from us within 7 days, please contact the clinic through MyChart or phone. If you have a critical or abnormal lab result, we will notify you by phone as soon as possible.  Submit refill requests through [employer] or call your pharmacy and they will forward the refill request to us. Please allow 3 business days for your refill to be completed.          Additional Information About Your Visit        MyChart Information     [employer] lets you send messages to your doctor, view your test results, renew your prescriptions, schedule appointments and more. To sign up, go to [email or web address] . Click on \"Log in\" on the left side of the screen, which will take you to the Welcome page. Then click on \"Sign up Now\" on the right side of the page.     You will be asked to enter the access code listed below, as well as some personal information. Please follow the directions to create your username and password.     Your access code is: [email or web address]  Expires: 2018 12:12 PM     Your access code will  in 90 days. If you need help or a new " "code, please call your [place] clinic or [phone].        Care EveryWhere ID     This is your Care EveryWhere ID. This could be used by other organizations to access your [place] medical records  [id number]        Your Vitals Were     Pulse Temperature Respirations Height BMI (Body Mass Index)       64 98.1  F (36.7  C) (Oral) 12 5' 0.75\" (1.543 m) 22.1 kg/m2        Blood Pressure from Last 3 Encounters:   01/05/18 104/78   02/24/17 96/70   01/10/17 108/72    Weight from Last 3 Encounters:   01/05/18 116 lb (52.6 kg)   02/24/17 118 lb (53.5 kg)   01/10/17 116 lb (52.6 kg)              Today, you had the following     No orders found for display         Where to get your medicines      These medications were sent to [facility]/pharmacy #[id number] - [place], [place] - [address]  [address] [facility] [address]     Phone:  [phone]     cetirizine 10 MG tablet    fluticasone 50 MCG/ACT spray          Primary Care Provider Office Phone # Fax #    Roe Gibson PA-C [phone]       [address] [facility] [address]        Equal Access to Services     JC ARNOLD AH: Hadii alvin merritt hadasho Soomaali, waaxda luqadaha, qaybta kaalmada adeegyada, waxay saba haywashington chapman . So [facility] [phone].    ATENCIÓN: Si habla español, tiene a conn disposición servicios gratuitos de asistencia lingüística. Llame al [phone].    We comply with applicable federal civil rights laws and [place] laws. We do not discriminate on the basis of race, color, national origin, age, disability, sex, sexual orientation, or gender identity.            Thank you!     Thank you for choosing [facility]  for your care. Our goal is always to provide you with excellent care. Hearing back from our patients is one way we can continue to improve our services. Please take a few minutes to complete the written survey that you may receive in the mail after your visit with us. Thank " you!             Your Updated Medication List - Protect others around you: Learn how to safely use, store and throw away your medicines at www.disposemymeds.org.          This list is accurate as of: 1/5/18 12:12 PM.  Always use your most recent med list.                   Brand Name Dispense Instructions for use Diagnosis    cetirizine 10 MG tablet    zyrTEC    90 tablet    Take 1 tablet (10 mg) by mouth every evening    Eustachian tube dysfunction, bilateral       fluticasone 50 MCG/ACT spray    FLONASE    1 Bottle    Spray 1-2 sprays into both nostrils daily    Eustachian tube dysfunction, bilateral

## 2018-01-06 LAB
CHOLEST SERPL-MCNC: 275 MG/DL
HDLC SERPL-MCNC: 45 MG/DL
LDLC SERPL CALC-MCNC: 199 MG/DL
NONHDLC SERPL-MCNC: 230 MG/DL
TRIGL SERPL-MCNC: 153 MG/DL

## 2018-01-10 PROBLEM — E78.2 MIXED HYPERLIPIDEMIA: Status: ACTIVE | Noted: 2018-01-10

## 2018-01-12 RX ORDER — ATORVASTATIN CALCIUM 10 MG/1
10 TABLET, FILM COATED ORAL DAILY
Qty: 30 TABLET | Refills: 3 | Status: SHIPPED | OUTPATIENT
Start: 2018-01-12 | End: 2018-02-23 | Stop reason: SINTOL

## 2018-02-12 ENCOUNTER — TELEPHONE (OUTPATIENT)
Dept: FAMILY MEDICINE | Facility: CLINIC | Age: 38
End: 2018-02-12

## 2018-02-12 DIAGNOSIS — E78.00 PURE HYPERCHOLESTEROLEMIA: Primary | ICD-10-CM

## 2018-02-12 NOTE — TELEPHONE ENCOUNTER
Reason for Call:  Other call back    Detailed comments: Pt's spouse came in to say that his wife (patient) got a rash after taking Atorvastatin.  Is there something else that the patient can take instead.    Phone Number Patient can be reached at: Home number on file [phone] (home)    Best Time: any    Can we leave a detailed message on this number? YES    Call taken on 2/12/2018 at 1:30 PM by RAY HERRERA

## 2018-02-13 RX ORDER — SIMVASTATIN 20 MG
20 TABLET ORAL AT BEDTIME
Qty: 30 TABLET | Refills: 1 | Status: SHIPPED | OUTPATIENT
Start: 2018-02-13 | End: 2018-04-23

## 2018-02-13 NOTE — TELEPHONE ENCOUNTER
That does not sound like fun!  Does the itching last all day?  I can send in something new but first we should definitely get her liver enzymes checked. See if she can come in for the lab.  Have her wait to start the zocor until we have the labs done.  Thanks!    Roe

## 2018-02-13 NOTE — TELEPHONE ENCOUNTER
Spoke with patient's  through [employer] .  He states that every time the patient takes her atorvastatin, about 10-15 minutes after she has problems with intense itching throughout her entire body.  No rash noticed.  He states this happened every time.  He states that it has gotten a little better since they have been taking her allergy medication with it, but still bothersome.  They would like to switch to a different cholesterol medication.  Please send new rx to [facility].  Shannon Wynn RN

## 2018-02-14 NOTE — TELEPHONE ENCOUNTER
Patient's  walked into the clinic.  Information given to him.  He is coming in on Friday to see Dr. Chakraborty at 7:20am.  He will bring his wife in for lab work then.  Appointment scheduled.  Shannon Wynn RN

## 2018-02-16 DIAGNOSIS — E78.00 PURE HYPERCHOLESTEROLEMIA: ICD-10-CM

## 2018-02-16 PROCEDURE — 36415 COLL VENOUS BLD VENIPUNCTURE: CPT | Performed by: PHYSICIAN ASSISTANT

## 2018-02-16 PROCEDURE — 80076 HEPATIC FUNCTION PANEL: CPT | Performed by: PHYSICIAN ASSISTANT

## 2018-02-17 LAB
ALBUMIN SERPL-MCNC: 4.1 G/DL (ref 3.4–5)
ALP SERPL-CCNC: 57 U/L (ref 40–150)
ALT SERPL W P-5'-P-CCNC: 18 U/L (ref 0–50)
AST SERPL W P-5'-P-CCNC: 14 U/L (ref 0–45)
BILIRUB DIRECT SERPL-MCNC: <0.1 MG/DL (ref 0–0.2)
BILIRUB SERPL-MCNC: 0.5 MG/DL (ref 0.2–1.3)
PROT SERPL-MCNC: 7.6 G/DL (ref 6.8–8.8)

## 2018-02-23 ENCOUNTER — OFFICE VISIT (OUTPATIENT)
Dept: FAMILY MEDICINE | Facility: CLINIC | Age: 38
End: 2018-02-23
Payer: COMMERCIAL

## 2018-02-23 VITALS
BODY MASS INDEX: 22.48 KG/M2 | HEART RATE: 74 BPM | RESPIRATION RATE: 16 BRPM | SYSTOLIC BLOOD PRESSURE: 128 MMHG | WEIGHT: 118 LBS | DIASTOLIC BLOOD PRESSURE: 86 MMHG

## 2018-02-23 DIAGNOSIS — Z12.4 CERVICAL CANCER SCREENING: ICD-10-CM

## 2018-02-23 DIAGNOSIS — R10.84 ABDOMINAL PAIN, GENERALIZED: Primary | ICD-10-CM

## 2018-02-23 DIAGNOSIS — N89.8 VAGINAL DISCHARGE: ICD-10-CM

## 2018-02-23 LAB
ALBUMIN UR-MCNC: NEGATIVE MG/DL
APPEARANCE UR: CLEAR
BETA HCG QUAL IFA URINE: NEGATIVE
BILIRUB UR QL STRIP: NEGATIVE
COLOR UR AUTO: YELLOW
GLUCOSE UR STRIP-MCNC: NEGATIVE MG/DL
HGB UR QL STRIP: NEGATIVE
KETONES UR STRIP-MCNC: NEGATIVE MG/DL
LEUKOCYTE ESTERASE UR QL STRIP: NEGATIVE
NITRATE UR QL: NEGATIVE
PH UR STRIP: 7 PH (ref 5–7)
SOURCE: NORMAL
SP GR UR STRIP: 1.01 (ref 1–1.03)
SPECIMEN SOURCE: NORMAL
UROBILINOGEN UR STRIP-ACNC: 0.2 EU/DL (ref 0.2–1)
WET PREP SPEC: NORMAL

## 2018-02-23 PROCEDURE — G0145 SCR C/V CYTO,THINLAYER,RESCR: HCPCS | Performed by: PHYSICIAN ASSISTANT

## 2018-02-23 PROCEDURE — 81003 URINALYSIS AUTO W/O SCOPE: CPT | Performed by: PHYSICIAN ASSISTANT

## 2018-02-23 PROCEDURE — 99214 OFFICE O/P EST MOD 30 MIN: CPT | Performed by: PHYSICIAN ASSISTANT

## 2018-02-23 PROCEDURE — 84703 CHORIONIC GONADOTROPIN ASSAY: CPT | Performed by: PHYSICIAN ASSISTANT

## 2018-02-23 PROCEDURE — 87210 SMEAR WET MOUNT SALINE/INK: CPT | Performed by: PHYSICIAN ASSISTANT

## 2018-02-23 PROCEDURE — G0476 HPV COMBO ASSAY CA SCREEN: HCPCS | Performed by: PHYSICIAN ASSISTANT

## 2018-02-23 RX ORDER — METRONIDAZOLE 7.5 MG/G
1 GEL VAGINAL AT BEDTIME
Qty: 70 G | Refills: 0 | Status: SHIPPED | OUTPATIENT
Start: 2018-02-23 | End: 2018-02-28

## 2018-02-23 NOTE — PROGRESS NOTES
"HPI    SUBJECTIVE:   Katrina Barry is a 37 year old female who presents to clinic today for the following health issues:    Concern - Abdominal pain with vaginal discharge  Onset: several months    Description:   Pelvic pain and vaginal discharge    Intensity: 2-5/10    Progression of Symptoms:  same and waxing and waning    Accompanying Signs & Symptoms:  Thick white vaginal discharge with odor    Previous history of similar problem:   None    Precipitating factors:   Worsened by: lying down    Alleviating factors:118/94  Improved by: moving around    Therapies Tried and outcome: None    Patient here with abd pain and vaginal discharge.  A week ago discharge was worse.    Pelvic pain.  No dysuria today  Abdominal pain has been on and off for a couple months.  Feels sad, moved here from [place], living with husbands family, no job.  Feeling \"useless\".  Has an IUD and feels like she is not used to this yet maybe.  Pelvic pain related to this?  Got it 5.5 years ago.    string- says no string with this IUD, not sure with translation if this is what she said or meant.   She is not sure which one she has.  Mirena or Paraguard.  Provider in [facility] said did not need to return for 8-10 years.  Gets periods monthly with IUD.  Patient requesting PAP today as well.    Started Zocor.  Feels it is better, still feels a \"little warm\" when taking but no itching noted.    Recheck /86.          Problem list and histories reviewed & adjusted, as indicated.  Additional history: as documented    Labs reviewed in EPIC    Reviewed and updated as needed this visit by clinical staff       Reviewed and updated as needed this visit by Provider         ROS:  Constitutional, HEENT, cardiovascular, pulmonary, gi and gu systems are negative, except as otherwise noted.    OBJECTIVE:     /86  Pulse 74  Resp 16  Wt 118 lb (53.5 kg)  LMP 01/30/2018  Breastfeeding? No  BMI 22.48 kg/m2  Body mass index is 22.48 kg/(m^2).  GENERAL: " healthy, alert and no distress  ABDOMEN: soft, nontender, no hepatosplenomegaly, no masses and bowel sounds normal   (female): normal female external genitalia, normal urethral meatus, vaginal mucosa, normal cervix/adnexa/uterus without masses or discharge  MS: no gross musculoskeletal defects noted, no edema  SKIN: no suspicious lesions or rashes  PSYCH: mentation appears normal and tearful    Diagnostic Test Results:  Results for orders placed or performed in visit on 02/23/18   UA reflex to Microscopic and Culture   Result Value Ref Range    Color Urine Yellow     Appearance Urine Clear     Glucose Urine Negative NEG^Negative mg/dL    Bilirubin Urine Negative NEG^Negative    Ketones Urine Negative NEG^Negative mg/dL    Specific Gravity Urine 1.015 1.003 - 1.035    Blood Urine Negative NEG^Negative    pH Urine 7.0 5.0 - 7.0 pH    Protein Albumin Urine Negative NEG^Negative mg/dL    Urobilinogen Urine 0.2 0.2 - 1.0 EU/dL    Nitrite Urine Negative NEG^Negative    Leukocyte Esterase Urine Negative NEG^Negative    Source Midstream Urine    Beta HCG Qual, Urine - [facility] ([id number])   Result Value Ref Range    Beta HCG Qual IFA Urine Negative NEG^Negative      Wet prep   Result Value Ref Range    Specimen Description Vagina     Wet Prep No Trichomonas seen     Wet Prep No clue cells seen     Wet Prep No yeast seen        ASSESSMENT/PLAN:   1. Abdominal pain, generalized  I think will need to do pelvic US to eval for IUD presence/placement.  I did not see strings on exam today.  - Wet prep  - UA reflex to Microscopic and Culture  - H Pylori antigen stool; Future  - Beta HCG Qual, Urine - [facility] ([id number])    2. Vaginal discharge  Visible discharge today on exam but not too excessive.  - Wet prep  - UA reflex to Microscopic and Culture  - metroNIDAZOLE (METROGEL) 0.75 % vaginal gel; Place 1 applicator (5 g) vaginally At Bedtime for 5 days  Dispense: 70 g; Refill: 0    3. Cervical cancer  screening    - Pap imaged thin layer screen with HPV - recommended age 30 - 65 years (select HPV order below)  - HPV High Risk Types DNA Cervical        Roe Gibson PA-C  [facility]      Physical Exam

## 2018-02-23 NOTE — LETTER
March 7, 2018    Katrina Barry  [address] [facility] [facility] [address]    Dear Katrina,  We are happy to inform you that your PAP smear result from 02/23/18 is normal.  We are now able to do a follow up test on PAP smears. The DNA test is for HPV (Human Papilloma Virus). Cervical cancer is closely linked with certain types of HPV. Your result showed no evidence of high risk HPV.  Therefore we recommend you return in 3 years for your next pap smear.  You will still need to return to the clinic every year for an annual exam and other preventive tests.  Please contact the clinic at [phone] with any questions.  Sincerely,    Roe Gibson PA-C/kostas

## 2018-02-23 NOTE — PATIENT INSTRUCTIONS
Use vaginal gel nightly for 5 nights.  Bring in stool sample to check for stomach infection.      If these things do not help you feel better we will check with a pelvic ultrasound on the IUD.

## 2018-02-27 DIAGNOSIS — R10.84 ABDOMINAL PAIN, GENERALIZED: ICD-10-CM

## 2018-02-27 LAB
COPATH REPORT: NORMAL
PAP: NORMAL

## 2018-02-27 PROCEDURE — 87338 HPYLORI STOOL AG IA: CPT | Performed by: PHYSICIAN ASSISTANT

## 2018-02-28 LAB
H PYLORI AG STL QL IA: NORMAL
SPECIMEN SOURCE: NORMAL

## 2018-03-01 LAB
FINAL DIAGNOSIS: NORMAL
HPV HR 12 DNA CVX QL NAA+PROBE: NEGATIVE
HPV16 DNA SPEC QL NAA+PROBE: NEGATIVE
HPV18 DNA SPEC QL NAA+PROBE: NEGATIVE
SPECIMEN DESCRIPTION: NORMAL
SPECIMEN SOURCE CVX/VAG CYTO: NORMAL

## 2018-03-02 ENCOUNTER — TELEPHONE (OUTPATIENT)
Dept: FAMILY MEDICINE | Facility: CLINIC | Age: 38
End: 2018-03-02

## 2018-03-02 NOTE — LETTER
[address], [address]  [facility] [address]  Phone: [phone]  Fax: [phone]    03/05/18    Katrina Barry  [address] [facility] [address] [place] [facility] [address]        Dear Katrina,    We have been unsuccessful in our attempts to reach you by phone.  Please call us at the [facility] [phone]   between the hours of 8:00am - 4:30 pm, Monday through Friday.      Roe Gibson PA-C/Pao SPIVEY RN

## 2018-03-02 NOTE — TELEPHONE ENCOUNTER
Roe Gibson PA-C P  Triage Pool                   Let's call Thi.  Her H pylori stool test is normal.  I think it would be a good idea to check further with a pelvic US to see about the placement of her IUD.     Thanks,   Roe       Unable to reach pt, interpretor called out to pt, attempted calls 3x , did not dial out, attempted to dial 1 before number and that  didn't work either. Attempted to call without interpretor too and phone rang, but attempted to leave message but it didn't accept message    Will try another day (maybe phone was off??) if unable to reach, will have to send letter    Pao Morejon RN, BS  Clinical Nurse Triage.

## 2018-03-05 NOTE — TELEPHONE ENCOUNTER
Hi- thanks for trying to contact Thi.  Can we send a letter to her?  I'm just closing her chart now and want to make sure she is OK and we get things taken care of with her.  I'd like to get a pelvic US for her.    Thanks!  Roe

## 2018-03-30 ENCOUNTER — RADIANT APPOINTMENT (OUTPATIENT)
Dept: ULTRASOUND IMAGING | Facility: CLINIC | Age: 38
End: 2018-03-30
Attending: PHYSICIAN ASSISTANT
Payer: COMMERCIAL

## 2018-03-30 DIAGNOSIS — R10.84 ABDOMINAL PAIN, GENERALIZED: ICD-10-CM

## 2018-03-30 PROCEDURE — 76856 US EXAM PELVIC COMPLETE: CPT | Performed by: OBSTETRICS & GYNECOLOGY

## 2018-03-30 PROCEDURE — 76830 TRANSVAGINAL US NON-OB: CPT | Performed by: OBSTETRICS & GYNECOLOGY

## 2018-04-11 ENCOUNTER — TELEPHONE (OUTPATIENT)
Dept: FAMILY MEDICINE | Facility: CLINIC | Age: 38
End: 2018-04-11

## 2018-04-11 DIAGNOSIS — N83.202 CYST OF LEFT OVARY: Primary | ICD-10-CM

## 2018-04-11 NOTE — TELEPHONE ENCOUNTER
Through [place]  called patient and contacted .  Patient was away at class but will be back this afternoon.  Will call back this afternoon.  Shannon Wynn RN

## 2018-04-12 NOTE — TELEPHONE ENCOUNTER
Spoke with the Pt. Relayed below information. The Pt did not have any questions or concerns at this time.     PCP: Please place US order for repeat scan in 2 months.   Thanks.     Concepción Paul RN -- [facility]

## 2018-04-23 DIAGNOSIS — E78.00 PURE HYPERCHOLESTEROLEMIA: ICD-10-CM

## 2018-04-24 RX ORDER — SIMVASTATIN 20 MG
TABLET ORAL
Qty: 30 TABLET | Refills: 1 | Status: SHIPPED | OUTPATIENT
Start: 2018-04-24 | End: 2018-07-09

## 2018-04-24 NOTE — TELEPHONE ENCOUNTER
"Requested Prescriptions   Pending Prescriptions Disp Refills     simvastatin (ZOCOR) 20 MG tablet [Pharmacy Med Name: SIMVASTATIN 20 MG TABLET] 30 tablet 1    Last Written Prescription Date:  2/13/18  Last Fill Quantity: 30,  # refills: 1   Last Office Visit: 2/23/2018   Future Office Visit:      Sig: TAKE 1 TABLET (20 MG) BY MOUTH AT BEDTIME    Statins Protocol Passed    4/23/2018  1:18 PM       Passed - LDL on file in past 12 months    Recent Labs   Lab Test  01/05/18   1210   LDL  199*            Passed - No abnormal creatine kinase in past 12 months    No lab results found.            Passed - Recent (12 mo) or future (30 days) visit within the authorizing provider's specialty    Patient had office visit in the last 12 months or has a visit in the next 30 days with authorizing provider or within the authorizing provider's specialty.  See \"Patient Info\" tab in inbasket, or \"Choose Columns\" in Meds & Orders section of the refill encounter.           Passed - Patient is age 18 or older       Passed - No active pregnancy on record       Passed - No positive pregnancy test in past 12 months          "

## 2018-05-25 ENCOUNTER — RADIANT APPOINTMENT (OUTPATIENT)
Dept: ULTRASOUND IMAGING | Facility: CLINIC | Age: 38
End: 2018-05-25
Attending: PHYSICIAN ASSISTANT
Payer: COMMERCIAL

## 2018-05-25 DIAGNOSIS — N83.202 CYST OF LEFT OVARY: ICD-10-CM

## 2018-05-25 PROCEDURE — 76856 US EXAM PELVIC COMPLETE: CPT | Performed by: OBSTETRICS & GYNECOLOGY

## 2018-05-25 PROCEDURE — 76830 TRANSVAGINAL US NON-OB: CPT | Performed by: OBSTETRICS & GYNECOLOGY

## 2018-07-09 DIAGNOSIS — E78.00 PURE HYPERCHOLESTEROLEMIA: ICD-10-CM

## 2018-07-09 NOTE — TELEPHONE ENCOUNTER
"Requested Prescriptions   Pending Prescriptions Disp Refills     simvastatin (ZOCOR) 20 MG tablet [Pharmacy Med Name: SIMVASTATIN 20 MG TABLET] 30 tablet 1    Last Written Prescription Date:  4/24/18  Last Fill Quantity: 30,  # refills: 1   Last Office Visit: 2/23/2018   Future Office Visit:      Sig: TAKE 1 TABLET (20 MG) BY MOUTH AT BEDTIME    Statins Protocol Passed    7/9/2018 10:20 AM       Passed - LDL on file in past 12 months    Recent Labs   Lab Test  01/05/18   1210   LDL  199*            Passed - No abnormal creatine kinase in past 12 months    No lab results found.            Passed - Recent (12 mo) or future (30 days) visit within the authorizing provider's specialty    Patient had office visit in the last 12 months or has a visit in the next 30 days with authorizing provider or within the authorizing provider's specialty.  See \"Patient Info\" tab in inbasket, or \"Choose Columns\" in Meds & Orders section of the refill encounter.           Passed - Patient is age 18 or older       Passed - No active pregnancy on record       Passed - No positive pregnancy test in past 12 months          "

## 2018-07-10 NOTE — TELEPHONE ENCOUNTER
Per result note pt was to follow up in 3-4 months but does patient need a lab only or OV?    Cipriano Willard RN, BSN

## 2018-07-15 RX ORDER — SIMVASTATIN 20 MG
TABLET ORAL
Qty: 30 TABLET | Refills: 1 | Status: SHIPPED | OUTPATIENT
Start: 2018-07-15 | End: 2018-09-16

## 2018-10-10 DIAGNOSIS — E78.00 PURE HYPERCHOLESTEROLEMIA: ICD-10-CM

## 2018-10-10 PROCEDURE — 80061 LIPID PANEL: CPT | Performed by: PHYSICIAN ASSISTANT

## 2018-10-10 PROCEDURE — 36415 COLL VENOUS BLD VENIPUNCTURE: CPT | Performed by: PHYSICIAN ASSISTANT

## 2018-10-11 LAB
CHOLEST SERPL-MCNC: 311 MG/DL
HDLC SERPL-MCNC: 49 MG/DL
LDLC SERPL CALC-MCNC: 249 MG/DL
NONHDLC SERPL-MCNC: 262 MG/DL
TRIGL SERPL-MCNC: 67 MG/DL

## 2018-10-24 RX ORDER — ROSUVASTATIN CALCIUM 5 MG/1
5 TABLET, COATED ORAL DAILY
Qty: 30 TABLET | Refills: 1 | Status: SHIPPED | OUTPATIENT
Start: 2018-10-24 | End: 2019-08-23

## 2018-10-26 DIAGNOSIS — E78.00 PURE HYPERCHOLESTEROLEMIA: ICD-10-CM

## 2018-10-26 NOTE — TELEPHONE ENCOUNTER
"Requested Prescriptions   Pending Prescriptions Disp Refills     simvastatin (ZOCOR) 20 MG tablet [Pharmacy Med Name: SIMVASTATIN 20 MG TABLET] 30 tablet 0     (Discontinued)      Last Written Prescription Date:  9/26/18    END:10/24/18  Reason for Discontinue: Alternate therapy  Last Fill Quantity: 30,   # refills: 0  Last Office Visit with [facility], [facility] or [facility] prescribing provider: 2/23/2018  Future Office Visit:    Next 5 appointments (look out 90 days)     Dec 17, 2018  8:00 AM KACY ORTIZ with Roe Gibson PA-C   [facility]    [address] [place] [address], [address]  [facility] [address] [phone]                    Sig: TAKE 1 TABLET BY MOUTH EVERY DAY AT BEDTIME APPT REQUIRED FOR FUTURE REFILLS    Statins Protocol Passed    10/26/2018 10:10 AM       Passed - LDL on file in past 12 months    Recent Labs   Lab Test  10/10/18   0759   LDL  249*            Passed - No abnormal creatine kinase in past 12 months    No lab results found.            Passed - Recent (12 mo) or future (30 days) visit within the authorizing provider's specialty    Patient had office visit in the last 12 months or has a visit in the next 30 days with authorizing provider or within the authorizing provider's specialty.  See \"Patient Info\" tab in inbasket, or \"Choose Columns\" in Meds & Orders section of the refill encounter.             Passed - Patient is age 18 or older       Passed - No active pregnancy on record       Passed - No positive pregnancy test in past 12 months          "

## 2018-10-30 RX ORDER — SIMVASTATIN 20 MG
TABLET ORAL
Qty: 30 TABLET | Refills: 0 | OUTPATIENT
Start: 2018-10-30

## 2018-12-17 ENCOUNTER — OFFICE VISIT (OUTPATIENT)
Dept: FAMILY MEDICINE | Facility: CLINIC | Age: 38
End: 2018-12-17
Payer: COMMERCIAL

## 2018-12-17 VITALS
BODY MASS INDEX: 21.53 KG/M2 | TEMPERATURE: 98 F | DIASTOLIC BLOOD PRESSURE: 74 MMHG | HEART RATE: 64 BPM | SYSTOLIC BLOOD PRESSURE: 106 MMHG | WEIGHT: 113 LBS | RESPIRATION RATE: 16 BRPM

## 2018-12-17 DIAGNOSIS — E78.2 MIXED HYPERLIPIDEMIA: Primary | ICD-10-CM

## 2018-12-17 DIAGNOSIS — H11.31 SUBCONJUNCTIVAL HEMATOMA, RIGHT: ICD-10-CM

## 2018-12-17 DIAGNOSIS — Z23 NEED FOR PROPHYLACTIC VACCINATION AND INOCULATION AGAINST INFLUENZA: ICD-10-CM

## 2018-12-17 PROCEDURE — 80061 LIPID PANEL: CPT | Performed by: PHYSICIAN ASSISTANT

## 2018-12-17 PROCEDURE — 36415 COLL VENOUS BLD VENIPUNCTURE: CPT | Performed by: PHYSICIAN ASSISTANT

## 2018-12-17 PROCEDURE — 90471 IMMUNIZATION ADMIN: CPT | Performed by: PHYSICIAN ASSISTANT

## 2018-12-17 PROCEDURE — 80053 COMPREHEN METABOLIC PANEL: CPT | Performed by: PHYSICIAN ASSISTANT

## 2018-12-17 PROCEDURE — 99214 OFFICE O/P EST MOD 30 MIN: CPT | Mod: 25 | Performed by: PHYSICIAN ASSISTANT

## 2018-12-17 PROCEDURE — 90686 IIV4 VACC NO PRSV 0.5 ML IM: CPT | Performed by: PHYSICIAN ASSISTANT

## 2018-12-17 NOTE — PATIENT INSTRUCTIONS
Patient Education     Subconjunctival Hemorrhage    A subconjunctival hemorrhage is a result of a broken blood vessel in the white part of the eye. It is usually painless and may be caused by coughing, sneezing, or vomiting. An injury to the eye can cause this. It can also be a sign of high blood pressure (hypertension) or a bleeding disorder.  This can look frightening. But the presence of the blood is not serious. The blood will be reabsorbed without treatment within 2 to 3 weeks.  Home care  You may continue your usual activities.  Follow-up care  Follow up with your healthcare provider, or as advised.  When to seek medical advice  Contact your healthcare provider right away if any of these occur:    Pain in the eye    Change in vision    The blood does not go away within 3 weeks    Increasing redness or swelling of the eye    Severe headache or dizziness    Signs of bruising or bleeding from other parts of your body  Date Last Reviewed: 8/1/2017 2000-2018 [employer]. [address] [place] [address], [place] [address]. All rights reserved. This information is not intended as a substitute for professional medical care. Always follow your healthcare professional's instructions.

## 2018-12-17 NOTE — PROGRESS NOTES

## 2018-12-17 NOTE — PROGRESS NOTES
SUBJECTIVE:   Katrina Barry is a 38 year old female who presents to clinic today for the following health issues:      Hyperlipidemia Follow-Up      Rate your low fat/cholesterol diet?: good    Taking statin?  Yes, muscle aches, possibly from other cause - bilateral shoulders and back while sleeping    Other lipid medications/supplements?:  none    We changed from zocor to crestor a couple months ago.  Patient here for a recheck today.  Denies any side effects of abd pain or general muscle aching.      PROBLEMS TO ADD ON...  Will get a flu shot today.  Right eye- friend hit her eye at a party about a week ago.  Has been using an old ABX eye drop for a couple.  No pain or visual trouble.    Problem list and histories reviewed & adjusted, as indicated.  Additional history: as documented    Labs reviewed in EPIC    Reviewed and updated as needed this visit by clinical staff  Tobacco  Allergies  Meds  Med Hx  Surg Hx  Fam Hx  Soc Hx      Reviewed and updated as needed this visit by Provider         ROS:  Constitutional, HEENT, cardiovascular, pulmonary, gi and gu systems are negative, except as otherwise noted.    OBJECTIVE:     /74 (BP Location: Right arm, Patient Position: Sitting, Cuff Size: Adult Regular)   Pulse 64   Temp 98  F (36.7  C) (Oral)   Resp 16   Wt 51.3 kg (113 lb)   BMI 21.53 kg/m    Body mass index is 21.53 kg/m .  GENERAL: healthy, alert and no distress  EYES: Eyes grossly normal to inspection and conjunctiva/corneas- subconjunctival hemorrhage OD  CV: regular rate and rhythm, normal S1 S2, no S3 or S4, no murmur, click or rub, no peripheral edema and peripheral pulses strong  MS: no gross musculoskeletal defects noted, no edema  SKIN: no suspicious lesions or rashes  PSYCH: mentation appears normal, affect normal/bright    Diagnostic Test Results:  pending    ASSESSMENT/PLAN:   1. Mixed hyperlipidemia  Will recheck labs today.  We had not been making much progress on zocor so last time  switched to 5mg Crestor to see if this would lower more effectively.  - Lipid panel reflex to direct LDL Fasting  - Comprehensive metabolic panel    2. Subconjunctival hematoma, right  - handout given and discussed.  She does not need to use drops.  Should resolve on it own.      Patient Instructions     Patient Education     Subconjunctival Hemorrhage    A subconjunctival hemorrhage is a result of a broken blood vessel in the white part of the eye. It is usually painless and may be caused by coughing, sneezing, or vomiting. An injury to the eye can cause this. It can also be a sign of high blood pressure (hypertension) or a bleeding disorder.  This can look frightening. But the presence of the blood is not serious. The blood will be reabsorbed without treatment within 2 to 3 weeks.  Home care  You may continue your usual activities.  Follow-up care  Follow up with your healthcare provider, or as advised.  When to seek medical advice  Contact your healthcare provider right away if any of these occur:    Pain in the eye    Change in vision    The blood does not go away within 3 weeks    Increasing redness or swelling of the eye    Severe headache or dizziness    Signs of bruising or bleeding from other parts of your body  Date Last Reviewed: 8/1/2017 2000-2018 [employer]. [address] [place] [address], [place] [address]. All rights reserved. This information is not intended as a substitute for professional medical care. Always follow your healthcare professional's instructions.               Roe Gibson PA-C  [facility]

## 2018-12-18 LAB
ALBUMIN SERPL-MCNC: 4 G/DL (ref 3.4–5)
ALP SERPL-CCNC: 54 U/L (ref 40–150)
ALT SERPL W P-5'-P-CCNC: 24 U/L (ref 0–50)
ANION GAP SERPL CALCULATED.3IONS-SCNC: 6 MMOL/L (ref 3–14)
AST SERPL W P-5'-P-CCNC: 12 U/L (ref 0–45)
BILIRUB SERPL-MCNC: 0.5 MG/DL (ref 0.2–1.3)
BUN SERPL-MCNC: 14 MG/DL (ref 7–30)
CALCIUM SERPL-MCNC: 9 MG/DL (ref 8.5–10.1)
CHLORIDE SERPL-SCNC: 106 MMOL/L (ref 94–109)
CHOLEST SERPL-MCNC: 263 MG/DL
CO2 SERPL-SCNC: 26 MMOL/L (ref 20–32)
CREAT SERPL-MCNC: 0.48 MG/DL (ref 0.52–1.04)
GFR SERPL CREATININE-BSD FRML MDRD: >90 ML/MIN/1.7M2
GLUCOSE SERPL-MCNC: 89 MG/DL (ref 70–99)
HDLC SERPL-MCNC: 54 MG/DL
LDLC SERPL CALC-MCNC: 199 MG/DL
NONHDLC SERPL-MCNC: 209 MG/DL
POTASSIUM SERPL-SCNC: 3.7 MMOL/L (ref 3.4–5.3)
PROT SERPL-MCNC: 7.7 G/DL (ref 6.8–8.8)
SODIUM SERPL-SCNC: 138 MMOL/L (ref 133–144)
TRIGL SERPL-MCNC: 52 MG/DL

## 2018-12-19 RX ORDER — ROSUVASTATIN CALCIUM 10 MG/1
5 TABLET, COATED ORAL DAILY
Qty: 90 TABLET | Refills: 1 | Status: SHIPPED | OUTPATIENT
Start: 2018-12-19 | End: 2018-12-26

## 2018-12-26 ENCOUNTER — TELEPHONE (OUTPATIENT)
Dept: FAMILY MEDICINE | Facility: CLINIC | Age: 38
End: 2018-12-26

## 2018-12-26 DIAGNOSIS — E78.2 MIXED HYPERLIPIDEMIA: ICD-10-CM

## 2018-12-26 RX ORDER — ROSUVASTATIN CALCIUM 10 MG/1
10 TABLET, COATED ORAL DAILY
Qty: 90 TABLET | Refills: 1 | Status: SHIPPED | OUTPATIENT
Start: 2018-12-26 | End: 2019-07-08

## 2018-12-26 NOTE — TELEPHONE ENCOUNTER
Notes recorded by Roe Gibson PA-C on 12/19/2018 at 8:51 AM   Please call [facility].  We will need  I think.  Her cholesterol is coming down with the new medication, crestor.  I want to increase the dose to 10mg though.  Recheck fasting labs again in 6 months.  I'll send in the new Rx for her.    Thanks,  Roe    Patient notified via [facility] .  RX for Crestor 10mg was sent in previously but sig was written to take 1/2 tab (5mg) daily.    Pharmacy notified.  Patient had not picked up previous rx.    New rx with correct sig re-sent.    Shannon Wynn RN

## 2019-07-08 DIAGNOSIS — E78.2 MIXED HYPERLIPIDEMIA: ICD-10-CM

## 2019-07-08 NOTE — TELEPHONE ENCOUNTER
"Requested Prescriptions   Pending Prescriptions Disp Refills     rosuvastatin (CRESTOR) 10 MG tablet [Pharmacy Med Name: ROSUVASTATIN CALCIUM 10 MG TAB] 30 tablet 5     Sig: TAKE 1 TABLET BY MOUTH EVERY DAY   Last Written Prescription Date:  12/26/18  Last Fill Quantity: 90,  # refills: 1   Last Office Visit: 12/17/2018 Arthur      Return in about 6 months (around 6/17/2019) for Physical Exam.     Future Office Visit:         Statins Protocol Passed - 7/8/2019  9:52 AM        Passed - LDL on file in past 12 months     Recent Labs   Lab Test 12/17/18  0854   *             Passed - No abnormal creatine kinase in past 12 months     No lab results found.             Passed - Recent (12 mo) or future (30 days) visit within the authorizing provider's specialty     Patient had office visit in the last 12 months or has a visit in the next 30 days with authorizing provider or within the authorizing provider's specialty.  See \"Patient Info\" tab in inbasket, or \"Choose Columns\" in Meds & Orders section of the refill encounter.              Passed - Medication is active on med list        Passed - Patient is age 18 or older        Passed - No active pregnancy on record        Passed - No positive pregnancy test in past 12 months        "

## 2019-07-10 RX ORDER — ROSUVASTATIN CALCIUM 10 MG/1
TABLET, COATED ORAL
Qty: 30 TABLET | Refills: 0 | Status: SHIPPED | OUTPATIENT
Start: 2019-07-10 | End: 2019-08-06

## 2019-07-10 NOTE — TELEPHONE ENCOUNTER
Prescription approved per [facility] [place] [facility].  For 30 day fill -- LM needs OV     Shital Houser RN

## 2019-08-06 DIAGNOSIS — E78.2 MIXED HYPERLIPIDEMIA: ICD-10-CM

## 2019-08-06 NOTE — TELEPHONE ENCOUNTER
"Requested Prescriptions   Pending Prescriptions Disp Refills     rosuvastatin (CRESTOR) 10 MG tablet [Pharmacy Med Name: ROSUVASTATIN CALCIUM 10 MG TAB] 30 tablet 0     Sig: TAKE 1 TABLET BY MOUTH EVERY DAY   Last Written Prescription Date:  7/10/19  Last Fill Quantity: 30,  # refills: 0   Last Office Visit: 12/17/2018 Arthur      Return in about 6 months (around 6/17/2019) for Physical Exam.     Future Office Visit:         Statins Protocol Passed - 8/6/2019 10:18 AM        Passed - LDL on file in past 12 months     Recent Labs   Lab Test 12/17/18  0854   *             Passed - No abnormal creatine kinase in past 12 months     No lab results found.             Passed - Recent (12 mo) or future (30 days) visit within the authorizing provider's specialty     Patient had office visit in the last 12 months or has a visit in the next 30 days with authorizing provider or within the authorizing provider's specialty.  See \"Patient Info\" tab in inbasket, or \"Choose Columns\" in Meds & Orders section of the refill encounter.              Passed - Medication is active on med list        Passed - Patient is age 18 or older        Passed - No active pregnancy on record        Passed - No positive pregnancy test in past 12 months        "

## 2019-08-06 NOTE — LETTER
[address], [address]  [facility] [address]  Phone: [phone]  Fax: [phone]    08/09/19    Katrina Barry  [address] [facility] [address]      Dear Katrina:       We recently received a request to refill your medication.    A review of your chart indicates that an appointment is required with your provider for a physical exam.     Please schedule an appointment for an office visit. You can schedule on line or call us at [phone].     We have authorized one refill of your medication to allow time for you to schedule your appointment.    Taking care of your health is important to us, and ongoing visits with your provider are vital to your care.  We look forward to seeing you in the near future.            Sincerely,      Roe Gibson PA-C/Pao SPIVEY RN

## 2019-08-09 RX ORDER — ROSUVASTATIN CALCIUM 10 MG/1
10 TABLET, COATED ORAL DAILY
Qty: 30 TABLET | Refills: 0 | Status: SHIPPED | OUTPATIENT
Start: 2019-08-09 | End: 2019-08-30

## 2019-08-09 NOTE — TELEPHONE ENCOUNTER
Medication is being filled for 1 time refill only due to:  pt is due for an appointment, letter sent to schedule appt within a month.    Pao Morejon RN, BS  Clinical Nurse Triage.

## 2019-08-23 ENCOUNTER — OFFICE VISIT (OUTPATIENT)
Dept: FAMILY MEDICINE | Facility: CLINIC | Age: 39
End: 2019-08-23
Payer: COMMERCIAL

## 2019-08-23 VITALS
WEIGHT: 112 LBS | OXYGEN SATURATION: 99 % | BODY MASS INDEX: 21.14 KG/M2 | HEART RATE: 66 BPM | DIASTOLIC BLOOD PRESSURE: 60 MMHG | RESPIRATION RATE: 12 BRPM | TEMPERATURE: 98.1 F | SYSTOLIC BLOOD PRESSURE: 100 MMHG | HEIGHT: 61 IN

## 2019-08-23 DIAGNOSIS — E78.2 MIXED HYPERLIPIDEMIA: ICD-10-CM

## 2019-08-23 DIAGNOSIS — Z00.00 ROUTINE GENERAL MEDICAL EXAMINATION AT A HEALTH CARE FACILITY: Primary | ICD-10-CM

## 2019-08-23 DIAGNOSIS — M62.830 SPASM OF BACK MUSCLES: ICD-10-CM

## 2019-08-23 LAB
ALBUMIN SERPL-MCNC: 4.1 G/DL (ref 3.4–5)
ALP SERPL-CCNC: 59 U/L (ref 40–150)
ALT SERPL W P-5'-P-CCNC: 32 U/L (ref 0–50)
ANION GAP SERPL CALCULATED.3IONS-SCNC: 4 MMOL/L (ref 3–14)
AST SERPL W P-5'-P-CCNC: 16 U/L (ref 0–45)
BILIRUB SERPL-MCNC: 0.4 MG/DL (ref 0.2–1.3)
BUN SERPL-MCNC: 9 MG/DL (ref 7–30)
CALCIUM SERPL-MCNC: 8.9 MG/DL (ref 8.5–10.1)
CHLORIDE SERPL-SCNC: 108 MMOL/L (ref 94–109)
CHOLEST SERPL-MCNC: 196 MG/DL
CO2 SERPL-SCNC: 29 MMOL/L (ref 20–32)
CREAT SERPL-MCNC: 0.48 MG/DL (ref 0.52–1.04)
ERYTHROCYTE [DISTWIDTH] IN BLOOD BY AUTOMATED COUNT: 15.2 % (ref 10–15)
GFR SERPL CREATININE-BSD FRML MDRD: >90 ML/MIN/{1.73_M2}
GLUCOSE SERPL-MCNC: 81 MG/DL (ref 70–99)
HCT VFR BLD AUTO: 39.2 % (ref 35–47)
HDLC SERPL-MCNC: 51 MG/DL
HGB BLD-MCNC: 12.3 G/DL (ref 11.7–15.7)
LDLC SERPL CALC-MCNC: 124 MG/DL
MCH RBC QN AUTO: 21.2 PG (ref 26.5–33)
MCHC RBC AUTO-ENTMCNC: 31.4 G/DL (ref 31.5–36.5)
MCV RBC AUTO: 68 FL (ref 78–100)
NONHDLC SERPL-MCNC: 145 MG/DL
PLATELET # BLD AUTO: 237 10E9/L (ref 150–450)
POTASSIUM SERPL-SCNC: 4.2 MMOL/L (ref 3.4–5.3)
PROT SERPL-MCNC: 7.5 G/DL (ref 6.8–8.8)
RBC # BLD AUTO: 5.79 10E12/L (ref 3.8–5.2)
SODIUM SERPL-SCNC: 141 MMOL/L (ref 133–144)
TRIGL SERPL-MCNC: 106 MG/DL
TSH SERPL DL<=0.005 MIU/L-ACNC: 1.13 MU/L (ref 0.4–4)
WBC # BLD AUTO: 4.9 10E9/L (ref 4–11)

## 2019-08-23 PROCEDURE — 99395 PREV VISIT EST AGE 18-39: CPT | Performed by: PHYSICIAN ASSISTANT

## 2019-08-23 PROCEDURE — 36415 COLL VENOUS BLD VENIPUNCTURE: CPT | Performed by: PHYSICIAN ASSISTANT

## 2019-08-23 PROCEDURE — 80061 LIPID PANEL: CPT | Performed by: PHYSICIAN ASSISTANT

## 2019-08-23 PROCEDURE — 80053 COMPREHEN METABOLIC PANEL: CPT | Performed by: PHYSICIAN ASSISTANT

## 2019-08-23 PROCEDURE — 85027 COMPLETE CBC AUTOMATED: CPT | Performed by: PHYSICIAN ASSISTANT

## 2019-08-23 PROCEDURE — 84443 ASSAY THYROID STIM HORMONE: CPT | Performed by: PHYSICIAN ASSISTANT

## 2019-08-23 RX ORDER — IBUPROFEN 600 MG/1
600 TABLET, FILM COATED ORAL
Qty: 60 TABLET | Refills: 1 | Status: SHIPPED | OUTPATIENT
Start: 2019-08-23 | End: 2021-11-19

## 2019-08-23 RX ORDER — CYCLOBENZAPRINE HCL 5 MG
5 TABLET ORAL
Qty: 60 TABLET | Refills: 1 | Status: SHIPPED | OUTPATIENT
Start: 2019-08-23 | End: 2021-12-07

## 2019-08-23 ASSESSMENT — ENCOUNTER SYMPTOMS
HEARTBURN: 0
SHORTNESS OF BREATH: 0
PARESTHESIAS: 0
CONSTIPATION: 0
PALPITATIONS: 0
DYSURIA: 0
WEAKNESS: 0
ARTHRALGIAS: 0
NERVOUS/ANXIOUS: 0
BREAST MASS: 0
COUGH: 0
ABDOMINAL PAIN: 0
DIZZINESS: 0
DIARRHEA: 0
EYE PAIN: 0
FREQUENCY: 0
JOINT SWELLING: 0
SORE THROAT: 0
NAUSEA: 0
HEMATOCHEZIA: 0
HEADACHES: 0
HEMATURIA: 0
FEVER: 0
MYALGIAS: 0
CHILLS: 0

## 2019-08-23 ASSESSMENT — MIFFLIN-ST. JEOR: SCORE: 1125.41

## 2019-08-23 NOTE — PROGRESS NOTES
SUBJECTIVE:   CC: Katrina Barry is an 38 year old woman who presents for preventive health visit.     Healthy Habits:     Getting at least 3 servings of Calcium per day:  Yes    Bi-annual eye exam:  Yes    Dental care twice a year:  Yes    Sleep apnea or symptoms of sleep apnea:  None    Diet:  Regular (no restrictions), Low salt and Low fat/cholesterol    Frequency of exercise:  None    Taking medications regularly:  Yes    Medication side effects:  Not applicable and None    PHQ-2 Total Score: 0    Additional concerns today:  No      Neck and back are painful- from work as .  Keeping her up at night.  Wondering about some thing to help her sleep also.  Tired some heat, no other OTC meds.  Used to use something when in [place].    Refill cholesterol medication. Fasting today.    Today's PHQ-2 Score:   PHQ-2 ( 1999 [employer]) 8/23/2019   Q1: Little interest or pleasure in doing things 0   Q2: Feeling down, depressed or hopeless 0   PHQ-2 Score 0   Q1: Little interest or pleasure in doing things Not at all   Q2: Feeling down, depressed or hopeless Not at all   PHQ-2 Score 0       Abuse: Current or Past(Physical, Sexual or Emotional)- No  Do you feel safe in your environment? Yes    Social History     Tobacco Use     Smoking status: Never Smoker     Smokeless tobacco: Never Used   Substance Use Topics     Alcohol use: No     Alcohol/week: 0.0 oz       Alcohol Use 8/23/2019   Prescreen: >3 drinks/day or >7 drinks/week? No   Prescreen: >3 drinks/day or >7 drinks/week? -   No flowsheet data found.    Reviewed orders with patient.  Reviewed health maintenance and updated orders accordingly - Yes  Lab work is in process  Labs reviewed in [facility]    Mammogram not appropriate for this patient based on age.    Pertinent mammograms are reviewed under the imaging tab.  History of abnormal Pap smear: NO - age 30-65 PAP every 5 years with negative HPV co-testing recommended  PAP / HPV Latest Ref Rng & Units 2/23/2018  "1/10/2017   PAP - NIL NIL   HPV 16 DNA NEG:Negative Negative Negative   HPV 18 DNA NEG:Negative Negative Negative   OTHER HR HPV NEG:Negative Negative Negative     Reviewed and updated as needed this visit by clinical staff  Tobacco  Allergies  Meds  Med Hx  Surg Hx  Fam Hx  Soc Hx        Reviewed and updated as needed this visit by Provider          Review of Systems   Constitutional: Negative for chills and fever.   HENT: Negative for congestion, ear pain, hearing loss and sore throat.    Eyes: Negative for pain and visual disturbance.   Respiratory: Negative for cough and shortness of breath.    Cardiovascular: Negative for chest pain, palpitations and peripheral edema.   Gastrointestinal: Negative for abdominal pain, constipation, diarrhea, heartburn, hematochezia and nausea.   Breasts:  Negative for tenderness, breast mass and discharge.   Genitourinary: Negative for dysuria, frequency, genital sores, hematuria, pelvic pain, urgency, vaginal bleeding and vaginal discharge.   Musculoskeletal: Negative for arthralgias, joint swelling and myalgias.   Skin: Negative for rash.   Neurological: Negative for dizziness, weakness, headaches and paresthesias.   Psychiatric/Behavioral: Negative for mood changes. The patient is not nervous/anxious.         OBJECTIVE:   /60 (BP Location: Right arm, Patient Position: Chair, Cuff Size: Adult Regular)   Pulse 66   Temp 98.1  F (36.7  C) (Oral)   Resp 12   Ht 1.549 m (5' 1\")   Wt 50.8 kg (112 lb)   LMP 08/04/2019 (Exact Date)   SpO2 99%   BMI 21.16 kg/m    Physical Exam  GENERAL: healthy, alert and no distress  EYES: Eyes grossly normal to inspection, PERRL and conjunctivae and sclerae normal  HENT: ear canals and TM's normal, nose and mouth without ulcers or lesions  NECK: no adenopathy, no asymmetry, masses, or scars and thyroid normal to palpation  RESP: lungs clear to auscultation - no rales, rhonchi or wheezes  CV: regular rate and rhythm, normal S1 S2, " "no S3 or S4, no murmur, click or rub, no peripheral edema and peripheral pulses strong  ABDOMEN: soft, nontender, no hepatosplenomegaly, no masses and bowel sounds normal  MS: no gross musculoskeletal defects noted, no edema  SKIN: no suspicious lesions or rashes  NEURO: Normal strength and tone, mentation intact and speech normal  PSYCH: mentation appears normal, affect normal/bright    Diagnostic Test Results:  Labs reviewed in Epic    ASSESSMENT/PLAN:   1. Routine general medical examination at a health care facility    - Lipid panel reflex to direct LDL Fasting  - Comprehensive metabolic panel  - CBC with platelets  - TSH with free T4 reflex    2. Mixed hyperlipidemia  Will check labs today and refill Crestor at correct dose.    3. Spasm of back muscles  Recommend also ice/heat.  Follow up if this is not improving.  - cyclobenzaprine (FLEXERIL) 5 MG tablet; Take 1 tablet (5 mg) by mouth nightly as needed for muscle spasms  Dispense: 60 tablet; Refill: 1  - ibuprofen (ADVIL/MOTRIN) 600 MG tablet; Take 1 tablet (600 mg) by mouth daily (with breakfast) Prn neck and back pain  Dispense: 60 tablet; Refill: 1    COUNSELING:  Reviewed preventive health counseling, as reflected in patient instructions    Estimated body mass index is 21.16 kg/m  as calculated from the following:    Height as of this encounter: 1.549 m (5' 1\").    Weight as of this encounter: 50.8 kg (112 lb).         reports that she has never smoked. She has never used smokeless tobacco.      Counseling Resources:  ATP IV Guidelines  Pooled Cohorts Equation Calculator  Breast Cancer Risk Calculator  FRAX Risk Assessment  ICSI Preventive Guidelines  Dietary Guidelines for Americans, 2010  USDA's MyPlate  ASA Prophylaxis  Lung CA Screening    Roe Gibson PA-C  [facility]  "

## 2019-08-30 RX ORDER — ROSUVASTATIN CALCIUM 10 MG/1
10 TABLET, COATED ORAL DAILY
Qty: 90 TABLET | Refills: 3 | Status: SHIPPED | OUTPATIENT
Start: 2019-08-30 | End: 2020-03-30

## 2020-03-24 ENCOUNTER — TELEPHONE (OUTPATIENT)
Dept: FAMILY MEDICINE | Facility: CLINIC | Age: 40
End: 2020-03-24

## 2020-03-24 NOTE — TELEPHONE ENCOUNTER
Name: Katrina Barry MRN: [id number]   Date: 3/30/2020 Status: Scheduled   Time: 9:20 AM Length: 40   Visit Type: PHYSICAL [122] Copay: $0.00   Provider: Roe Gibson PA-C Department:  FAMILY PRACTICE     Sent letter, patient doesn't speak English, does need to reschedule per COVID protocol.     Whit Coppola/

## 2020-03-30 DIAGNOSIS — E78.2 MIXED HYPERLIPIDEMIA: ICD-10-CM

## 2020-03-30 RX ORDER — ROSUVASTATIN CALCIUM 10 MG/1
10 TABLET, COATED ORAL DAILY
Qty: 90 TABLET | Refills: 1 | Status: SHIPPED | OUTPATIENT
Start: 2020-03-30 | End: 2020-08-03

## 2020-03-30 NOTE — TELEPHONE ENCOUNTER
"rosuvastatin (CRESTOR) 10 MG tablet  Last Written Prescription Date:  08/30/19  Last Fill Quantity: 90,  # refills: 3   Last office visit: 8/23/2019 with prescribing provider:  Roe MARTINEZ     Future Office Visit:      Requested Prescriptions   Pending Prescriptions Disp Refills     rosuvastatin (CRESTOR) 10 MG tablet 90 tablet 3     Sig: Take 1 tablet (10 mg) by mouth daily . MUST SEE PROVIDER FOR REFILL       Statins Protocol Passed - 3/30/2020  9:04 AM        Passed - LDL on file in past 12 months     Recent Labs   Lab Test 08/23/19  0900   *             Passed - No abnormal creatine kinase in past 12 months     No lab results found.             Passed - Recent (12 mo) or future (30 days) visit within the authorizing provider's specialty     Patient has had an office visit with the authorizing provider or a provider within the authorizing providers department within the previous 12 mos or has a future within next 30 days. See \"Patient Info\" tab in inbasket, or \"Choose Columns\" in Meds & Orders section of the refill encounter.              Passed - Medication is active on med list        Passed - Patient is age 18 or older        Passed - No active pregnancy on record        Passed - No positive pregnancy test in past 12 months             "

## 2020-07-31 DIAGNOSIS — E78.2 MIXED HYPERLIPIDEMIA: ICD-10-CM

## 2020-08-03 RX ORDER — ROSUVASTATIN CALCIUM 10 MG/1
10 TABLET, COATED ORAL DAILY
Qty: 90 TABLET | Refills: 0 | Status: SHIPPED | OUTPATIENT
Start: 2020-08-03 | End: 2020-09-28

## 2020-08-03 NOTE — TELEPHONE ENCOUNTER
Pres[place] [facility] [place].    Routing to MA/TC Team to help patient schedule a fasting annual office visit.

## 2020-09-16 ENCOUNTER — OFFICE VISIT (OUTPATIENT)
Dept: FAMILY MEDICINE | Facility: CLINIC | Age: 40
End: 2020-09-16
Payer: COMMERCIAL

## 2020-09-16 VITALS
WEIGHT: 119.5 LBS | TEMPERATURE: 98.2 F | DIASTOLIC BLOOD PRESSURE: 68 MMHG | HEART RATE: 70 BPM | BODY MASS INDEX: 22.56 KG/M2 | SYSTOLIC BLOOD PRESSURE: 124 MMHG | HEIGHT: 61 IN | OXYGEN SATURATION: 97 %

## 2020-09-16 DIAGNOSIS — Z00.00 ROUTINE GENERAL MEDICAL EXAMINATION AT A HEALTH CARE FACILITY: Primary | ICD-10-CM

## 2020-09-16 DIAGNOSIS — Z23 NEED FOR PROPHYLACTIC VACCINATION AND INOCULATION AGAINST INFLUENZA: ICD-10-CM

## 2020-09-16 DIAGNOSIS — R12 HEARTBURN: ICD-10-CM

## 2020-09-16 DIAGNOSIS — H81.13 BENIGN PAROXYSMAL POSITIONAL VERTIGO, BILATERAL: ICD-10-CM

## 2020-09-16 DIAGNOSIS — H04.201 WATERING OF RIGHT EYE: ICD-10-CM

## 2020-09-16 DIAGNOSIS — E78.2 MIXED HYPERLIPIDEMIA: ICD-10-CM

## 2020-09-16 LAB
ALBUMIN SERPL-MCNC: 4 G/DL (ref 3.4–5)
ALP SERPL-CCNC: 52 U/L (ref 40–150)
ALT SERPL W P-5'-P-CCNC: 24 U/L (ref 0–50)
ANION GAP SERPL CALCULATED.3IONS-SCNC: 7 MMOL/L (ref 3–14)
AST SERPL W P-5'-P-CCNC: 11 U/L (ref 0–45)
BILIRUB SERPL-MCNC: 0.4 MG/DL (ref 0.2–1.3)
BUN SERPL-MCNC: 13 MG/DL (ref 7–30)
CALCIUM SERPL-MCNC: 8.8 MG/DL (ref 8.5–10.1)
CHLORIDE SERPL-SCNC: 107 MMOL/L (ref 94–109)
CHOLEST SERPL-MCNC: 279 MG/DL
CO2 SERPL-SCNC: 24 MMOL/L (ref 20–32)
CREAT SERPL-MCNC: 0.58 MG/DL (ref 0.52–1.04)
ERYTHROCYTE [DISTWIDTH] IN BLOOD BY AUTOMATED COUNT: 16.2 % (ref 10–15)
GFR SERPL CREATININE-BSD FRML MDRD: >90 ML/MIN/{1.73_M2}
GLUCOSE SERPL-MCNC: 88 MG/DL (ref 70–99)
HCT VFR BLD AUTO: 37.8 % (ref 35–47)
HDLC SERPL-MCNC: 47 MG/DL
HGB BLD-MCNC: 12.4 G/DL (ref 11.7–15.7)
LDLC SERPL CALC-MCNC: 212 MG/DL
MCH RBC QN AUTO: 21.3 PG (ref 26.5–33)
MCHC RBC AUTO-ENTMCNC: 32.8 G/DL (ref 31.5–36.5)
MCV RBC AUTO: 65 FL (ref 78–100)
NONHDLC SERPL-MCNC: 232 MG/DL
PLATELET # BLD AUTO: 219 10E9/L (ref 150–450)
POTASSIUM SERPL-SCNC: 4 MMOL/L (ref 3.4–5.3)
PROT SERPL-MCNC: 8 G/DL (ref 6.8–8.8)
RBC # BLD AUTO: 5.82 10E12/L (ref 3.8–5.2)
SODIUM SERPL-SCNC: 138 MMOL/L (ref 133–144)
TRIGL SERPL-MCNC: 98 MG/DL
TSH SERPL DL<=0.005 MIU/L-ACNC: 1.39 MU/L (ref 0.4–4)
WBC # BLD AUTO: 6.4 10E9/L (ref 4–11)

## 2020-09-16 PROCEDURE — 84443 ASSAY THYROID STIM HORMONE: CPT | Performed by: PHYSICIAN ASSISTANT

## 2020-09-16 PROCEDURE — 90686 IIV4 VACC NO PRSV 0.5 ML IM: CPT | Performed by: PHYSICIAN ASSISTANT

## 2020-09-16 PROCEDURE — 80061 LIPID PANEL: CPT | Performed by: PHYSICIAN ASSISTANT

## 2020-09-16 PROCEDURE — 90471 IMMUNIZATION ADMIN: CPT | Performed by: PHYSICIAN ASSISTANT

## 2020-09-16 PROCEDURE — 85027 COMPLETE CBC AUTOMATED: CPT | Performed by: PHYSICIAN ASSISTANT

## 2020-09-16 PROCEDURE — 80053 COMPREHEN METABOLIC PANEL: CPT | Performed by: PHYSICIAN ASSISTANT

## 2020-09-16 PROCEDURE — 99395 PREV VISIT EST AGE 18-39: CPT | Mod: 25 | Performed by: PHYSICIAN ASSISTANT

## 2020-09-16 PROCEDURE — 36415 COLL VENOUS BLD VENIPUNCTURE: CPT | Performed by: PHYSICIAN ASSISTANT

## 2020-09-16 PROCEDURE — 99213 OFFICE O/P EST LOW 20 MIN: CPT | Mod: 25 | Performed by: PHYSICIAN ASSISTANT

## 2020-09-16 ASSESSMENT — MIFFLIN-ST. JEOR: SCORE: 1148.55

## 2020-09-16 NOTE — PROGRESS NOTES
SUBJECTIVE:   CC: Katrina Barry is an 39 year old woman who presents for preventive health visit.     Patient has been advised of split billing requirements and indicates understanding: Yes  Healthy Habits:    Do you get at least three servings of calcium containing foods daily (dairy, green leafy vegetables, etc.)? yes    Amount of exercise or daily activities, outside of work: 2 day(s) per week    Problems taking medications regularly No    Medication side effects: Yes- dizziness  with cholesterol medication    Have you had an eye exam in the past two years? yes    Do you see a dentist twice per year? yes    Do you have sleep apnea, excessive snoring or daytime drowsiness?no      PROBLEMS TO ADD ON...  Hyperlipidemia Follow-Up      Are you regularly taking any medication or supplement to lower your cholesterol?   Yes- crestor    Are you having muscle aches or other side effects that you think could be caused by your cholesterol lowering medication?  Yes- maybe dizziness    Dizzy/Vertigo- spinning, off balance.  Not sure if medication?  Happens in the morning and then usually gets better throughout the day.  Uses zyrtec and flonase daily.    Right eye watering- works as - bothering her, burning and watering.    Today's PHQ-2 Score:   PHQ-2 ( 1999 Pfizer) 9/16/2020 8/23/2019   Q1: Little interest or pleasure in doing things 0 0   Q2: Feeling down, depressed or hopeless 0 0   PHQ-2 Score 0 0   Q1: Little interest or pleasure in doing things - Not at all   Q2: Feeling down, depressed or hopeless - Not at all   PHQ-2 Score - 0       Abuse: Current or Past(Physical, Sexual or Emotional)- No  Do you feel safe in your environment? Yes      Social History     Tobacco Use     Smoking status: Never Smoker     Smokeless tobacco: Never Used   Substance Use Topics     Alcohol use: No     Alcohol/week: 0.0 standard drinks     If you drink alcohol do you typically have >3 drinks per day or >7 drinks per week? No       "               Reviewed orders with patient.  Reviewed health maintenance and updated orders accordingly - Yes  Lab work is in process  Labs reviewed in EPIC    Mammogram not appropriate for this patient based on age.    Pertinent mammograms are reviewed under the imaging tab.  History of abnormal Pap smear: NO  PAP / HPV Latest Ref Rng & Units 2/23/2018 1/10/2017   PAP - NIL NIL   HPV 16 DNA NEG:Negative Negative Negative   HPV 18 DNA NEG:Negative Negative Negative   OTHER HR HPV NEG:Negative Negative Negative     Reviewed and updated as needed this visit by clinical staff  Tobacco  Allergies  Meds  Med Hx  Surg Hx  Fam Hx  Soc Hx        Reviewed and updated as needed this visit by Provider            ROS:  CONSTITUTIONAL: NEGATIVE for fever, chills, change in weight  INTEGUMENTARU/SKIN: NEGATIVE for worrisome rashes, moles or lesions  EYES: POSITIVE for discharge right- watery  ENT: NEGATIVE for ear, mouth and throat problems  RESP: NEGATIVE for significant cough or SOB  BREAST: NEGATIVE for masses, tenderness or discharge  CV: NEGATIVE for chest pain, palpitations or peripheral edema  GI: NEGATIVE for nausea, abdominal pain, heartburn, or change in bowel habits  : NEGATIVE for unusual urinary or vaginal symptoms. Periods are regular.  MUSCULOSKELETAL: NEGATIVE for significant arthralgias or myalgia  NEURO: POSITIVE for dizziness/lightheadedness  PSYCHIATRIC: NEGATIVE for changes in mood or affect    OBJECTIVE:   /68   Pulse 70   Temp 98.2  F (36.8  C) (Oral)   Ht 1.54 m (5' 0.63\")   Wt 54.2 kg (119 lb 8 oz)   LMP 08/17/2020   SpO2 97%   BMI 22.86 kg/m    EXAM:  GENERAL: healthy, alert and no distress  EYES: Eyes grossly normal to inspection, PERRL and conjunctivae and sclerae normal  HENT: ear canals and TM's normal, nose and mouth without ulcers or lesions  NECK: no adenopathy, no asymmetry, masses, or scars and thyroid normal to palpation  RESP: lungs clear to auscultation - no rales, " "rhonchi or wheezes  BREAST: normal without masses, tenderness or nipple discharge and no palpable axillary masses or adenopathy  CV: regular rate and rhythm, normal S1 S2, no S3 or S4, no murmur, click or rub, no peripheral edema and peripheral pulses strong  ABDOMEN: soft, nontender, no hepatosplenomegaly, no masses and bowel sounds normal  MS: no gross musculoskeletal defects noted, no edema  SKIN: no suspicious lesions or rashes  NEURO: Normal strength and tone, mentation intact and speech normal  PSYCH: mentation appears normal, affect normal/bright    Diagnostic Test Results:  Labs reviewed in Epic    ASSESSMENT/PLAN:   1. Routine general medical examination at a health care facility    - Lipid panel reflex to direct LDL Fasting  - Comprehensive metabolic panel (BMP + Alb, Alk Phos, ALT, AST, Total. Bili, TP)  - CBC with platelets  - TSH with free T4 reflex    2. Mixed hyperlipidemia  Has recent refill of crestor- recheck labs today.  - Lipid panel reflex to direct LDL Fasting  - Comprehensive metabolic panel (BMP + Alb, Alk Phos, ALT, AST, Total. Bili, TP)    3. Benign paroxysmal positional vertigo, bilateral  Discuss with her today, patient handout given.  Offered referral but she declined due to not happening often.  Will let me know if symptoms worsen    4. Need for prophylactic vaccination and inoculation against influenza       INFLUENZA VACCINE IM > 6 MONTHS VALENT IIV4 [25748]                      5. Heartburn  TUMs seems to be working right now.  She knows which food make this worse so she can avoid them.  Can try Pepcid AC OTC if she wants to.    6. Watering of right eye  Referral given today.  - EYE ADULT REFERRAL; Future      COUNSELING:   Reviewed preventive health counseling, as reflected in patient instructions    Estimated body mass index is 22.86 kg/m  as calculated from the following:    Height as of this encounter: 1.54 m (5' 0.63\").    Weight as of this encounter: 54.2 kg (119 lb 8 " oz).        She reports that she has never smoked. She has never used smokeless tobacco.      Counseling Resources:  ATP IV Guidelines  Pooled Cohorts Equation Calculator  Breast Cancer Risk Calculator  BRCA-Related Cancer Risk Assessment: FHS-7 Tool  FRAX Risk Assessment  ICSI Preventive Guidelines  Dietary Guidelines for Americans, 2010  USDA's MyPlate  ASA Prophylaxis  Lung CA Screening    Roe Gibson PA-C  [facility]

## 2020-09-16 NOTE — PROGRESS NOTES
SUBJECTIVE:   CC: Katrina Barry is an 39 year old woman who presents for preventive health visit.     {Split Bill scripting  The purpose of this visit is to discuss your medical history and prevent health problems before you are sick. You may be responsible for a co-pay, coinsurance, or deductible if your visit today includes services such as checking on a sore throat, having an x-ray or lab test, or treating and evaluating a new or existing condition :518237}  Patient has been advised of split billing requirements and indicates understanding: {Yes and No:428554}  HPI  {Add if <65 person on Medicare  - Required Questions (Optional):338971}  {Outside tests to abstract? :534493}    {additional problems to add (Optional):809496}    Today's PHQ-2 Score:   PHQ-2 ( 1999 Pfizer) 8/23/2019   Q1: Little interest or pleasure in doing things 0   Q2: Feeling down, depressed or hopeless 0   PHQ-2 Score 0   Q1: Little interest or pleasure in doing things Not at all   Q2: Feeling down, depressed or hopeless Not at all   PHQ-2 Score 0       Abuse: Current or Past (Physical, Sexual or Emotional) - { :169666}  Do you feel safe in your environment? { :961989}        Social History     Tobacco Use     Smoking status: Never Smoker     Smokeless tobacco: Never Used   Substance Use Topics     Alcohol use: No     Alcohol/week: 0.0 standard drinks     {Rooming Staff- Complete this question if Prescreen response is not shown below for today's visit. If you drink alcohol do you typically have >3 drinks per day or >7 drinks per week? (Optional):469009}    Alcohol Use 8/23/2019   Prescreen: >3 drinks/day or >7 drinks/week? No   Prescreen: >3 drinks/day or >7 drinks/week? -   {add AUDIT responses (Optional) (A score of 7 for adult men is an indication of hazardous drinking; a score of 8 or more is an indication of an alcohol use disorder.  A score of 7 or more for adult women is an indication of hazardous drinking or an alchohol use  "disorder):589603}    Reviewed orders with patient.  Reviewed health maintenance and updated orders accordingly - { :678631::\"Yes\"}  {Chronicprobdata (optional):938653}    {Mammo Decision Support (Optional):886983}    Pertinent mammograms are reviewed under the imaging tab.  History of abnormal Pap smear: { :128401}  PAP / HPV Latest Ref Rng & Units 2/23/2018 1/10/2017   PAP - NIL NIL   HPV 16 DNA NEG:Negative Negative Negative   HPV 18 DNA NEG:Negative Negative Negative   OTHER HR HPV NEG:Negative Negative Negative     Reviewed and updated as needed this visit by clinical staff         Reviewed and updated as needed this visit by Provider        {HISTORY OPTIONS (Optional):356034}    Review of Systems  {FEMALE ROS (Optional):106515}     OBJECTIVE:   There were no vitals taken for this visit.  Physical Exam  {Exam Choices (Optional):867558}    {Diagnostic Test Results (Optional):643865::\"Diagnostic Test Results:\",\"Labs reviewed in Epic\"}    ASSESSMENT/PLAN:   {Diag Picklist:129153}    Patient has been advised of split billing requirements and indicates understanding: {YES / NO:894406::\"Yes\"}  COUNSELING:  {FEMALE COUNSELING MESSAGES:718167::\"Reviewed preventive health counseling, as reflected in patient instructions\"}    Estimated body mass index is 21.16 kg/m  as calculated from the following:    Height as of 8/23/19: 1.549 m (5' 1\").    Weight as of 8/23/19: 50.8 kg (112 lb).    {Weight Management Plan (ACO) Complete if BMI is abnormal-  Ages 18-64  BMI >24.9.  Age 65+ with BMI <23 or >30 (Optional):137987}    She reports that she has never smoked. She has never used smokeless tobacco.      Counseling Resources:  ATP IV Guidelines  Pooled Cohorts Equation Calculator  Breast Cancer Risk Calculator  BRCA-Related Cancer Risk Assessment: FHS-7 Tool  FRAX Risk Assessment  ICSI Preventive Guidelines  Dietary Guidelines for Americans, 2010  USDA's MyPlate  ASA Prophylaxis  Lung CA Screening    Roe Gibson, " HERMES  [facility]

## 2020-09-16 NOTE — PATIENT INSTRUCTIONS
Preventive Health Recommendations  Female Ages 26 - 39  Yearly exam:   See your health care provider every year in order to    Review health changes.     Discuss preventive care.      Review your medicines if you your doctor has prescribed any.    Until age 30: Get a Pap test every three years (more often if you have had an abnormal result).    After age 30: Talk to your doctor about whether you should have a Pap test every 3 years or have a Pap test with HPV screening every 5 years.   You do not need a Pap test if your uterus was removed (hysterectomy) and you have not had cancer.  You should be tested each year for STDs (sexually transmitted diseases), if you're at risk.   Talk to your provider about how often to have your cholesterol checked.  If you are at risk for diabetes, you should have a diabetes test (fasting glucose).  Shots: Get a flu shot each year. Get a tetanus shot every 10 years.   Nutrition:     Eat at least 5 servings of fruits and vegetables each day.    Eat whole-grain bread, whole-wheat pasta and brown rice instead of white grains and rice.    Get adequate Calcium and Vitamin D.     Lifestyle    Exercise at least 150 minutes a week (30 minutes a day, 5 days of the week). This will help you control your weight and prevent disease.    Limit alcohol to one drink per day.    No smoking.     Wear sunscreen to prevent skin cancer.    See your dentist every six months for an exam and cleaning.    Patient Education     Benign Paroxysmal Positional Vertigo     Your health care provider may move your head in certain ways to treat your BPPV.     Benign paroxysmal positional vertigo (BPPV) is a problem with the inner ear. The inner ear contains the vestibular system. This system is what helps you keep your balance. BPPV causes a feeling of spinning. It is a common problem of the vestibular system.  Understanding the vestibular system  The vestibular system of the ear is made up of very tiny parts. They  include the utricle, saccule, and semicircular canals. The utricle is a tiny organ that contains calcium crystals. In some people, the crystals can move into the semicircular canals. When this happens, the system no longer works as it should. This causes BPPV. Benign means it is not life threatening. Paroxysmal means it happens suddenly. Positional means that it happens when you move your head. Vertigo is a feeling of spinning.  What causes BPPV?  Causes include injury to your head or neck. Other problems with the vestibular system may cause BPPV. In many people, the cause of BPPV is not known.  Symptoms of BPPV  You many have repeated feelings of spinning (vertigo). The vertigo usually lasts less than 1 minute. Some movements, such as rolling over in bed, can bring on vertigo.  Diagnosing BPPV  Your primary healthcare provider may diagnose and treat your BPPV. Or you may see an ear, nose, and throat doctor (otolaryngologist). In some cases, you may see a nervous system doctor (neurologist).  The healthcare provider will ask about your symptoms and your medical history. He or she will examine you. You may have hearing and balance tests. As part of the exam, your healthcare provider may have you move your head and body in certain ways. If you have BPPV, the movements can bring on vertigo. Your provider will also look for abnormal movements of your eyes. You may have other tests to check your vestibular or nervous systems.  Treatment for BPPV  Your healthcare provider may try to move the calcium crystals. This is done by having you move your head and neck in certain ways. This treatment is safe and often works well. You may also be told to do these movements at home. You may still have vertigo for a few weeks. Your healthcare provider will recheck your symptoms, usually in about a month. Special physical therapy may also be part of treatment. In rare cases, surgery may be needed for BPPV that does not go  away.     When to call the healthcare provider  Call your healthcare provider right away if you have any of these:    Symptoms that do not go away with treatment    Symptoms that get worse    New symptoms   Date Last Reviewed: 5/1/2017 2000-2019 [employer]. [address] [place] [address], [place] [address]. All rights reserved. This information is not intended as a substitute for professional medical care. Always follow your healthcare professional's instructions.             If TUMS does not work for heartburn- you can try Pepcid AC      If you do not hear from the eye specialist by next week please call us to let us know and we will help you.

## 2020-09-28 ENCOUNTER — APPOINTMENT (OUTPATIENT)
Dept: INTERPRETER SERVICES | Facility: CLINIC | Age: 40
End: 2020-09-28
Payer: COMMERCIAL

## 2020-09-28 RX ORDER — ROSUVASTATIN CALCIUM 10 MG/1
10 TABLET, COATED ORAL DAILY
Qty: 90 TABLET | Refills: 3 | Status: SHIPPED | OUTPATIENT
Start: 2020-09-28 | End: 2021-06-03

## 2021-06-02 DIAGNOSIS — E78.2 MIXED HYPERLIPIDEMIA: ICD-10-CM

## 2021-06-03 RX ORDER — ROSUVASTATIN CALCIUM 10 MG/1
TABLET, COATED ORAL
Qty: 90 TABLET | Refills: 0 | Status: SHIPPED | OUTPATIENT
Start: 2021-06-03 | End: 2021-06-29

## 2021-11-15 DIAGNOSIS — M62.830 SPASM OF BACK MUSCLES: ICD-10-CM

## 2021-11-16 NOTE — TELEPHONE ENCOUNTER
Patient has upcoming appointment with [facility] 12/7/21. Routing refill request to provider for review/approval because:  NSAID Medications Failed 11/15/2021 11:37 AM   Protocol Details  Blood pressure under 140/90 in past 12 months    Normal ALT on file in past 12 months    Normal AST on file in past 12 months    Recent (12 mo) or future (30 days) visit within the authorizing provider's specialty    Normal CBC on file in past 12 months    Normal serum creatinine on file in past 12 months     Blas DUNCAN RN

## 2021-11-19 RX ORDER — IBUPROFEN 600 MG/1
TABLET, FILM COATED ORAL
Qty: 30 TABLET | Refills: 0 | Status: SHIPPED | OUTPATIENT
Start: 2021-11-19 | End: 2021-12-22

## 2021-12-07 ENCOUNTER — OFFICE VISIT (OUTPATIENT)
Dept: FAMILY MEDICINE | Facility: CLINIC | Age: 41
End: 2021-12-07
Payer: COMMERCIAL

## 2021-12-07 VITALS
WEIGHT: 123 LBS | OXYGEN SATURATION: 97 % | HEIGHT: 61 IN | DIASTOLIC BLOOD PRESSURE: 84 MMHG | HEART RATE: 98 BPM | TEMPERATURE: 98.6 F | SYSTOLIC BLOOD PRESSURE: 122 MMHG | BODY MASS INDEX: 23.22 KG/M2

## 2021-12-07 DIAGNOSIS — Z11.59 NEED FOR HEPATITIS C SCREENING TEST: ICD-10-CM

## 2021-12-07 DIAGNOSIS — E78.2 MIXED HYPERLIPIDEMIA: ICD-10-CM

## 2021-12-07 DIAGNOSIS — Z00.00 ROUTINE GENERAL MEDICAL EXAMINATION AT A HEALTH CARE FACILITY: Primary | ICD-10-CM

## 2021-12-07 DIAGNOSIS — Z11.4 SCREENING FOR HIV (HUMAN IMMUNODEFICIENCY VIRUS): ICD-10-CM

## 2021-12-07 LAB
BASOPHILS # BLD AUTO: 0 10E3/UL (ref 0–0.2)
BASOPHILS NFR BLD AUTO: 1 %
EOSINOPHIL # BLD AUTO: 0.4 10E3/UL (ref 0–0.7)
EOSINOPHIL NFR BLD AUTO: 7 %
ERYTHROCYTE [DISTWIDTH] IN BLOOD BY AUTOMATED COUNT: 16 % (ref 10–15)
HCT VFR BLD AUTO: 43.8 % (ref 35–47)
HGB BLD-MCNC: 12.6 G/DL (ref 11.7–15.7)
IMM GRANULOCYTES # BLD: 0 10E3/UL
IMM GRANULOCYTES NFR BLD: 1 %
LYMPHOCYTES # BLD AUTO: 1.6 10E3/UL (ref 0.8–5.3)
LYMPHOCYTES NFR BLD AUTO: 27 %
MCH RBC QN AUTO: 20.8 PG (ref 26.5–33)
MCHC RBC AUTO-ENTMCNC: 28.8 G/DL (ref 31.5–36.5)
MCV RBC AUTO: 72 FL (ref 78–100)
MONOCYTES # BLD AUTO: 0.4 10E3/UL (ref 0–1.3)
MONOCYTES NFR BLD AUTO: 7 %
NEUTROPHILS # BLD AUTO: 3.5 10E3/UL (ref 1.6–8.3)
NEUTROPHILS NFR BLD AUTO: 57 %
NRBC # BLD AUTO: 0 10E3/UL
NRBC BLD AUTO-RTO: 0 /100
PLATELET # BLD AUTO: 221 10E3/UL (ref 150–450)
RBC # BLD AUTO: 6.06 10E6/UL (ref 3.8–5.2)
WBC # BLD AUTO: 5.9 10E3/UL (ref 4–11)

## 2021-12-07 PROCEDURE — 90686 IIV4 VACC NO PRSV 0.5 ML IM: CPT | Performed by: FAMILY MEDICINE

## 2021-12-07 PROCEDURE — 80061 LIPID PANEL: CPT | Performed by: FAMILY MEDICINE

## 2021-12-07 PROCEDURE — 80050 GENERAL HEALTH PANEL: CPT | Performed by: FAMILY MEDICINE

## 2021-12-07 PROCEDURE — 86803 HEPATITIS C AB TEST: CPT | Performed by: FAMILY MEDICINE

## 2021-12-07 PROCEDURE — 99396 PREV VISIT EST AGE 40-64: CPT | Performed by: FAMILY MEDICINE

## 2021-12-07 PROCEDURE — 36415 COLL VENOUS BLD VENIPUNCTURE: CPT | Performed by: FAMILY MEDICINE

## 2021-12-07 PROCEDURE — 90471 IMMUNIZATION ADMIN: CPT | Performed by: FAMILY MEDICINE

## 2021-12-07 PROCEDURE — 87389 HIV-1 AG W/HIV-1&-2 AB AG IA: CPT | Performed by: FAMILY MEDICINE

## 2021-12-07 RX ORDER — ROSUVASTATIN CALCIUM 10 MG/1
TABLET, COATED ORAL
Qty: 90 TABLET | Refills: 3 | Status: SHIPPED | OUTPATIENT
Start: 2021-12-07 | End: 2023-01-20

## 2021-12-07 ASSESSMENT — MIFFLIN-ST. JEOR: SCORE: 1152.36

## 2021-12-07 NOTE — LETTER
December 17, 2021      Katrina Barry  [address] [facility] [facility] [address] [place] [facility] [address]        Dear ,    We are writing to inform you of your test results.    Your results are normal.     Your cholesterol is better from last year. Continue with diet and exercise.   For further questions or concerns please let us know.      Resulted Orders   HIV Antigen Antibody Combo   Result Value Ref Range    HIV Antigen Antibody Combo Nonreactive Nonreactive      Comment:      HIV-1 p24 Ag & HIV-1/HIV-2 Ab Not Detected   Hepatitis C Screen Reflex to HCV RNA Quant and Genotype   Result Value Ref Range    Hepatitis C Antibody Nonreactive Nonreactive    Narrative    Assay performance characteristics have not been established for newborns, infants, and children.   Comprehensive metabolic panel (BMP + Alb, Alk Phos, ALT, AST, Total. Bili, TP)   Result Value Ref Range    Sodium 139 133 - 144 mmol/L    Potassium 4.0 3.4 - 5.3 mmol/L    Chloride 106 94 - 109 mmol/L    Carbon Dioxide (CO2) 28 20 - 32 mmol/L    Anion Gap 5 3 - 14 mmol/L    Urea Nitrogen 11 7 - 30 mg/dL    Creatinine 0.48 (L) 0.52 - 1.04 mg/dL    Calcium 8.7 8.5 - 10.1 mg/dL    Glucose 99 70 - 99 mg/dL    Alkaline Phosphatase 61 40 - 150 U/L    AST 12 0 - 45 U/L    ALT 26 0 - 50 U/L    Protein Total 8.0 6.8 - 8.8 g/dL    Albumin 3.9 3.4 - 5.0 g/dL    Bilirubin Total 0.4 0.2 - 1.3 mg/dL    GFR Estimate >90 >60 mL/min/1.73m2      Comment:      As of July 11, 2021, eGFR is calculated by the CKD-EPI creatinine equation, without race adjustment. eGFR can be influenced by muscle mass, exercise, and diet. The reported eGFR is an estimation only and is only applicable if the renal function is stable.   Lipid panel reflex to direct LDL Fasting   Result Value Ref Range    Cholesterol 262 (H) <200 mg/dL    Triglycerides 86 <150 mg/dL    Direct Measure HDL 58 >=50 mg/dL    LDL Cholesterol Calculated 187 (H) <=100 mg/dL    Non HDL Cholesterol 204 (H) <130 mg/dL    Patient Fasting > 8hrs?  Yes     Narrative    Cholesterol  Desirable:  <200 mg/dL    Triglycerides  Normal:  Less than 150 mg/dL  Borderline High:  150-199 mg/dL  High:  200-499 mg/dL  Very High:  Greater than or equal to 500 mg/dL    Direct Measure HDL  Female:  Greater than or equal to 50 mg/dL   Male:  Greater than or equal to 40 mg/dL    LDL Cholesterol  Desirable:  <100mg/dL  Above Desirable:  100-129 mg/dL   Borderline High:  130-159 mg/dL   High:  160-189 mg/dL   Very High:  >= 190 mg/dL    Non HDL Cholesterol  Desirable:  130 mg/dL  Above Desirable:  130-159 mg/dL  Borderline High:  160-189 mg/dL  High:  190-219 mg/dL  Very High:  Greater than or equal to 220 mg/dL   TSH with free T4 reflex   Result Value Ref Range    TSH 1.22 0.40 - 4.00 mU/L   CBC with platelets and differential   Result Value Ref Range    WBC Count 5.9 4.0 - 11.0 10e3/uL    RBC Count 6.06 (H) 3.80 - 5.20 10e6/uL    Hemoglobin 12.6 11.7 - 15.7 g/dL    Hematocrit 43.8 35.0 - 47.0 %    MCV 72 (L) 78 - 100 fL    MCH 20.8 (L) 26.5 - 33.0 pg    MCHC 28.8 (L) 31.5 - 36.5 g/dL    RDW 16.0 (H) 10.0 - 15.0 %    Platelet Count 221 150 - 450 10e3/uL    % Neutrophils 57 %    % Lymphocytes 27 %    % Monocytes 7 %    % Eosinophils 7 %    % Basophils 1 %    % Immature Granulocytes 1 %    NRBCs per 100 WBC 0 <1 /100    Absolute Neutrophils 3.5 1.6 - 8.3 10e3/uL    Absolute Lymphocytes 1.6 0.8 - 5.3 10e3/uL    Absolute Monocytes 0.4 0.0 - 1.3 10e3/uL    Absolute Eosinophils 0.4 0.0 - 0.7 10e3/uL    Absolute Basophils 0.0 0.0 - 0.2 10e3/uL    Absolute Immature Granulocytes 0.0 <=0.4 10e3/uL    Absolute NRBCs 0.0 10e3/uL       If you have any questions or concerns, please call the clinic at the number listed above.       Sincerely,      Paty Kelsey MD/naif

## 2021-12-07 NOTE — PROGRESS NOTES
SUBJECTIVE:   CC: Katrina Barry is an 41 year old woman who presents for preventive health visit.       Patient has been advised of split billing requirements and indicates understanding: Yes  Healthy Habits:    Getting at least 3 servings of Calcium per day:  Yes    Bi-annual eye exam:  NO    Dental care twice a year:  NO    Sleep apnea or symptoms of sleep apnea:  None    Diet:  Regular (no restrictions)    Frequency of exercise:  2-3 days/week    Duration of exercise:  15-30 minutes    Taking medications regularly:  Yes    Barriers to taking medications:  None    Medication side effects:  None    PHQ-2 Total Score:    Additional concerns today:  No    IUD in place- placed about 9 years ago in [place]. LMP- 11/20/21      Today's PHQ-2 Score:   PHQ-2 ( 1999 [employer]) 12/7/2021   Q1: Little interest or pleasure in doing things 0   Q2: Feeling down, depressed or hopeless 0   PHQ-2 Score 0   PHQ-2 Total Score (12-17 Years)- Positive if 3 or more points; Administer PHQ-A if positive -   Q1: Little interest or pleasure in doing things -   Q2: Feeling down, depressed or hopeless -   PHQ-2 Score -       Abuse: Current or Past (Physical, Sexual or Emotional) - No  Do you feel safe in your environment? Yes    Have you ever done Advance Care Planning? (For example, a Health Directive, POLST, or a discussion with a medical provider or your loved ones about your wishes): No, advance care planning information given to patient to review.  Patient declined advance care planning discussion at this time.    Social History     Tobacco Use     Smoking status: Never Smoker     Smokeless tobacco: Never Used   Substance Use Topics     Alcohol use: No     Alcohol/week: 0.0 standard drinks     If you drink alcohol do you typically have >3 drinks per day or >7 drinks per week? No    Alcohol Use 8/23/2019   Prescreen: >3 drinks/day or >7 drinks/week? No   Prescreen: >3 drinks/day or >7 drinks/week? -       Reviewed orders with patient.   "Reviewed health maintenance and updated orders accordingly - Yes  Labs reviewed in EPIC    Breast Cancer Screening:  Any new diagnosis of family breast, ovarian, or bowel cancer? No    FHS-7: No flowsheet data found.  click delete button to remove this line now  Mammogram Screening - Offered annual screening and updated Health Maintenance for mutual plan based on risk factor consideration    Pertinent mammograms are reviewed under the imaging tab.    History of abnormal Pap smear: NO - age 30-65 PAP every 5 years with negative HPV co-testing recommended  PAP / HPV Latest Ref Rng & Units 2/23/2018 1/10/2017   PAP (Historical) - NIL NIL   HPV16 NEG:Negative Negative Negative   HPV18 NEG:Negative Negative Negative   HRHPV NEG:Negative Negative Negative     Reviewed and updated as needed this visit by clinical staff  Tobacco  Allergies    Med Hx  Surg Hx  Fam Hx  Soc Hx       Reviewed and updated as needed this visit by Provider                   Review of Systems  CONSTITUTIONAL: NEGATIVE for fever, chills, change in weight  INTEGUMENTARU/SKIN: NEGATIVE for worrisome rashes, moles or lesions  EYES: NEGATIVE for vision changes or irritation  ENT: NEGATIVE for ear, mouth and throat problems  RESP: NEGATIVE for significant cough or SOB  BREAST: NEGATIVE for masses, tenderness or discharge  CV: NEGATIVE for chest pain, palpitations or peripheral edema  GI: NEGATIVE for nausea, abdominal pain, heartburn, or change in bowel habits  : NEGATIVE for unusual urinary or vaginal symptoms. Periods are regular.  MUSCULOSKELETAL: NEGATIVE for significant arthralgias or myalgia  NEURO: NEGATIVE for weakness, dizziness or paresthesias  ENDOCRINE: NEGATIVE for temperature intolerance, skin/hair changes  PSYCHIATRIC: NEGATIVE for changes in mood or affect     OBJECTIVE:   /84   Pulse 98   Temp 98.6  F (37  C) (Oral)   Ht 1.537 m (5' 0.5\")   Wt 55.8 kg (123 lb)   SpO2 97%   BMI 23.63 kg/m    Physical Exam  GENERAL: " healthy, alert and no distress  EYES: Eyes grossly normal to inspection, PERRL and conjunctivae and sclerae normal  HENT: ear canals and TM's normal, nose and mouth without ulcers or lesions  NECK: no adenopathy, no asymmetry, masses, or scars and thyroid normal to palpation  RESP: lungs clear to auscultation - no rales, rhonchi or wheezes  BREAST: normal without masses, tenderness or nipple discharge and no palpable axillary masses or adenopathy  CV: regular rate and rhythm, normal S1 S2, no S3 or S4, no murmur, click or rub, no peripheral edema and peripheral pulses strong  ABDOMEN: soft, nontender, no hepatosplenomegaly, no masses and bowel sounds normal  MS: no gross musculoskeletal defects noted, no edema  SKIN: no suspicious lesions or rashes  NEURO: Normal strength and tone, mentation intact and speech normal  PSYCH: mentation appears normal, affect normal/bright    Diagnostic Test Results:  Labs reviewed in Epic  none     ASSESSMENT/PLAN:   (Z00.00) Routine general medical examination at a health care facility  (primary encounter diagnosis)  Plan: CBC with platelets and differential,         Comprehensive metabolic panel (BMP + Alb, Alk         Phos, ALT, AST, Total. Bili, TP), Lipid panel         reflex to direct LDL Fasting, TSH with free T4         reflex      (Z11.4) Screening for HIV (human immunodeficiency virus)  Plan: HIV Antigen Antibody Combo      (Z11.59) Need for hepatitis C screening test  Plan: Hepatitis C Screen Reflex to HCV RNA Quant and         Genotype      (E78.2) Mixed hyperlipidemia  Plan: Lipid panel reflex to direct LDL Fasting,         rosuvastatin (CRESTOR) 10 MG tablet        Patient has been advised of split billing requirements and indicates understanding: Yes  COUNSELING:  Reviewed preventive health counseling, as reflected in patient instructions       Regular exercise       Healthy diet/nutrition    Estimated body mass index is 23.63 kg/m  as calculated from the following:     "Height as of this encounter: 1.537 m (5' 0.5\").    Weight as of this encounter: 55.8 kg (123 lb).        She reports that she has never smoked. She has never used smokeless tobacco.      Counseling Resources:  ATP IV Guidelines  Pooled Cohorts Equation Calculator  Breast Cancer Risk Calculator  BRCA-Related Cancer Risk Assessment: FHS-7 Tool  FRAX Risk Assessment  ICSI Preventive Guidelines  Dietary Guidelines for Americans, 2010  USDA's MyPlate  ASA Prophylaxis  Lung CA Screening    Paty Kelsey MD  [facility]  "

## 2021-12-07 NOTE — LETTER
December 9, 2021      Katrina Barry  [address] [place] [address]        Dear ,    We are writing to inform you of your test results.    All labs are within normal limits and do not require further investigation.   For further questions or concerns please let us know.       Resulted Orders   HIV Antigen Antibody Combo   Result Value Ref Range    HIV Antigen Antibody Combo Nonreactive Nonreactive      Comment:      HIV-1 p24 Ag & HIV-1/HIV-2 Ab Not Detected   Hepatitis C Screen Reflex to HCV RNA Quant and Genotype   Result Value Ref Range    Hepatitis C Antibody Nonreactive Nonreactive    Narrative    Assay performance characteristics have not been established for newborns, infants, and children.   Comprehensive metabolic panel (BMP + Alb, Alk Phos, ALT, AST, Total. Bili, TP)   Result Value Ref Range    Sodium 139 133 - 144 mmol/L    Potassium 4.0 3.4 - 5.3 mmol/L    Chloride 106 94 - 109 mmol/L    Carbon Dioxide (CO2) 28 20 - 32 mmol/L    Anion Gap 5 3 - 14 mmol/L    Urea Nitrogen 11 7 - 30 mg/dL    Creatinine 0.48 (L) 0.52 - 1.04 mg/dL    Calcium 8.7 8.5 - 10.1 mg/dL    Glucose 99 70 - 99 mg/dL    Alkaline Phosphatase 61 40 - 150 U/L    AST 12 0 - 45 U/L    ALT 26 0 - 50 U/L    Protein Total 8.0 6.8 - 8.8 g/dL    Albumin 3.9 3.4 - 5.0 g/dL    Bilirubin Total 0.4 0.2 - 1.3 mg/dL    GFR Estimate >90 >60 mL/min/1.73m2      Comment:      As of July 11, 2021, eGFR is calculated by the CKD-EPI creatinine equation, without race adjustment. eGFR can be influenced by muscle mass, exercise, and diet. The reported eGFR is an estimation only and is only applicable if the renal function is stable.   TSH with free T4 reflex   Result Value Ref Range    TSH 1.22 0.40 - 4.00 mU/L   CBC with platelets and differential   Result Value Ref Range    WBC Count 5.9 4.0 - 11.0 10e3/uL    RBC Count 6.06 (H) 3.80 - 5.20 10e6/uL    Hemoglobin 12.6 11.7 - 15.7 g/dL    Hematocrit 43.8 35.0 - 47.0 %    MCV 72 (L) 78 - 100 fL     MCH 20.8 (L) 26.5 - 33.0 pg    MCHC 28.8 (L) 31.5 - 36.5 g/dL    RDW 16.0 (H) 10.0 - 15.0 %    Platelet Count 221 150 - 450 10e3/uL    % Neutrophils 57 %    % Lymphocytes 27 %    % Monocytes 7 %    % Eosinophils 7 %    % Basophils 1 %    % Immature Granulocytes 1 %    NRBCs per 100 WBC 0 <1 /100    Absolute Neutrophils 3.5 1.6 - 8.3 10e3/uL    Absolute Lymphocytes 1.6 0.8 - 5.3 10e3/uL    Absolute Monocytes 0.4 0.0 - 1.3 10e3/uL    Absolute Eosinophils 0.4 0.0 - 0.7 10e3/uL    Absolute Basophils 0.0 0.0 - 0.2 10e3/uL    Absolute Immature Granulocytes 0.0 <=0.4 10e3/uL    Absolute NRBCs 0.0 10e3/uL       If you have any questions or concerns, please call the clinic at the number listed above.       Sincerely,      Paty Kelsey MD/eq

## 2021-12-08 LAB
ALBUMIN SERPL-MCNC: 3.9 G/DL (ref 3.4–5)
ALP SERPL-CCNC: 61 U/L (ref 40–150)
ALT SERPL W P-5'-P-CCNC: 26 U/L (ref 0–50)
ANION GAP SERPL CALCULATED.3IONS-SCNC: 5 MMOL/L (ref 3–14)
AST SERPL W P-5'-P-CCNC: 12 U/L (ref 0–45)
BILIRUB SERPL-MCNC: 0.4 MG/DL (ref 0.2–1.3)
BUN SERPL-MCNC: 11 MG/DL (ref 7–30)
CALCIUM SERPL-MCNC: 8.7 MG/DL (ref 8.5–10.1)
CHLORIDE BLD-SCNC: 106 MMOL/L (ref 94–109)
CO2 SERPL-SCNC: 28 MMOL/L (ref 20–32)
CREAT SERPL-MCNC: 0.48 MG/DL (ref 0.52–1.04)
GFR SERPL CREATININE-BSD FRML MDRD: >90 ML/MIN/1.73M2
GLUCOSE BLD-MCNC: 99 MG/DL (ref 70–99)
HCV AB SERPL QL IA: NONREACTIVE
HIV 1+2 AB+HIV1 P24 AG SERPL QL IA: NONREACTIVE
POTASSIUM BLD-SCNC: 4 MMOL/L (ref 3.4–5.3)
PROT SERPL-MCNC: 8 G/DL (ref 6.8–8.8)
SODIUM SERPL-SCNC: 139 MMOL/L (ref 133–144)
TSH SERPL DL<=0.005 MIU/L-ACNC: 1.22 MU/L (ref 0.4–4)

## 2021-12-09 NOTE — RESULT ENCOUNTER NOTE
Please send patient a letter to let them know results are normal.   Please find attached the lab results from your recent physical.   All labs are within normal limits and do not require further investigation.   For further questions or concerns please let us know.     Best Wishes,    Dr. Bettencourt

## 2021-12-15 LAB
CHOLEST SERPL-MCNC: 262 MG/DL
FASTING STATUS PATIENT QL REPORTED: YES
HDLC SERPL-MCNC: 58 MG/DL
LDLC SERPL CALC-MCNC: 187 MG/DL
NONHDLC SERPL-MCNC: 204 MG/DL
TRIGL SERPL-MCNC: 86 MG/DL

## 2021-12-16 NOTE — RESULT ENCOUNTER NOTE
Please send patient a letter to let them know results are normal.     Your cholesterol is better from last year. Continue with diet and exercise.   For further questions or concerns please let us know.

## 2021-12-19 DIAGNOSIS — M62.830 SPASM OF BACK MUSCLES: ICD-10-CM

## 2021-12-21 NOTE — TELEPHONE ENCOUNTER
Routing refill request to provider for review/approval because: NSAID Medication protocol failed: Labs out of range.    Normal CBC on file in past 12 months        Recent Labs   Lab Test 12/07/21  1000   WBC 5.9   RBC 6.06*   HGB 12.6   HCT 43.8         Muriel RODRIGUEZ RN

## 2021-12-22 RX ORDER — IBUPROFEN 600 MG/1
TABLET, FILM COATED ORAL
Qty: 30 TABLET | Refills: 0 | Status: SHIPPED | OUTPATIENT
Start: 2021-12-22 | End: 2023-02-07

## 2022-02-02 DIAGNOSIS — M62.830 SPASM OF BACK MUSCLES: ICD-10-CM

## 2022-02-02 NOTE — TELEPHONE ENCOUNTER
Routing refill request to provider for review/approval because:  Labs out of range:  CBC, cr  Cipriano PEACOCK RN, BSN

## 2022-02-04 RX ORDER — IBUPROFEN 600 MG/1
TABLET, FILM COATED ORAL
Qty: 30 TABLET | Refills: 0 | OUTPATIENT
Start: 2022-02-04

## 2022-02-04 NOTE — TELEPHONE ENCOUNTER
Not sure what she is using this for.  Cr level is off also.  We should discuss this further. Can she use tylenol OTC?    Roe

## 2022-02-04 NOTE — TELEPHONE ENCOUNTER
Refused Prescriptions     ibuprofen (ADVIL/MOTRIN) 600 MG tablet         Sig: N/A    Disp:  30 tablet    Refills:  0    Start: 2/4/2022    Class: E-Prescribe    Refused by: Roe Gibson PA-C    Refusal reason: OTHER    For: Spasm of back muscles        Neck and back are painful- from work as .  Keeping her up at night.  Wondering about some thing to help her sleep also.  Tired some heat, no other OTC meds.  Used to use something when in [place].    Creatinine   Date Value Ref Range Status   12/07/2021 0.48 (L) 0.52 - 1.04 mg/dL Final   09/16/2020 0.58 0.52 - 1.04 mg/dL Final

## 2022-02-06 NOTE — TELEPHONE ENCOUNTER
Let's try a muscle relaxer at night.  Also have her use Voltaren topical gel to the neck for pain relief.  It is OTC but I'll try prescribing it.  If these are not helping she needs an appointment.      Roe

## 2022-02-14 ENCOUNTER — APPOINTMENT (OUTPATIENT)
Dept: INTERPRETER SERVICES | Facility: CLINIC | Age: 42
End: 2022-02-14
Payer: COMMERCIAL

## 2022-02-14 NOTE — TELEPHONE ENCOUNTER
Called and spoke with patient via , relayed provider message below. Patient has picked up prescriptions at pharmacy already. Patient reports neck/back pain is off and on, some days it's better but some days it hurts again. Encouraged patient to call to schedule an appointment if pain does not improve. Patient verbalized understanding and agreed with plan.     Blas DUNCAN RN

## 2022-03-01 ENCOUNTER — OFFICE VISIT (OUTPATIENT)
Dept: FAMILY MEDICINE | Facility: CLINIC | Age: 42
End: 2022-03-01
Payer: COMMERCIAL

## 2022-03-01 VITALS
DIASTOLIC BLOOD PRESSURE: 76 MMHG | BODY MASS INDEX: 24.01 KG/M2 | WEIGHT: 125 LBS | OXYGEN SATURATION: 100 % | TEMPERATURE: 97.9 F | SYSTOLIC BLOOD PRESSURE: 132 MMHG | HEART RATE: 89 BPM

## 2022-03-01 DIAGNOSIS — T83.32XA INTRAUTERINE CONTRACEPTIVE DEVICE THREADS LOST, INITIAL ENCOUNTER: ICD-10-CM

## 2022-03-01 DIAGNOSIS — N64.4 BREAST PAIN: Primary | ICD-10-CM

## 2022-03-01 PROCEDURE — 99214 OFFICE O/P EST MOD 30 MIN: CPT | Performed by: FAMILY MEDICINE

## 2022-03-01 RX ORDER — COPPER 313.4 MG/1
1 INTRAUTERINE DEVICE INTRAUTERINE ONCE
COMMUNITY
End: 2022-03-28

## 2022-03-01 RX ORDER — COPPER 313.4 MG/1
1 INTRAUTERINE DEVICE INTRAUTERINE ONCE
Status: DISCONTINUED
Start: 2022-03-01 | End: 2022-03-01 | Stop reason: CLARIF

## 2022-03-01 NOTE — PROGRESS NOTES
Assessment & Plan     Breast pain  - will evaluate further with mammogram   - MA Diagnostic Digital Bilateral; Future    Intrauterine contraceptive device threads lost, initial encounter  - unable to visualize IUD string at os. Will obtain pelvic US for further evaluation. Once results available will refer accordingly.   - US Pelvic Complete with Transvaginal; Future             See Patient Instructions    Return in about 1 week (around 3/8/2022) for imaging .    Paty Kelsey MD  [facility]    Jero Herndon is a 41 year old who presents for the following health issues     HPI     Breast Concern  Onset/Duration: about 1 month   Description:   Location: left breast   Pain or tenderness: YES  Redness:  no   Intensity: mild  Progression of Symptoms: improving and worsening  Accompanying Signs & Symptoms:  Any lumps in axillary region:  no   Movable: YES  Nipple discharge: no   Changes in the skin or nipple: no  On Hormone therapy:  no   Does it change with menstrual cycle: YES  Previous history of similar problem: no  First degree relative with breast cancer: a positive family history of ovarian cancer.  Precipitating factors:           Worsened by: none  Alleviating factors:            Improved by: none  Therapies tried and outcome: None  No LMP recorded. (Menstrual status: IUD).    Patient would also like IUD removed and replaced. She had it placed in [place] about 10 years ago.   Denies any issues with device until now.     Review of Systems   Constitutional, HEENT, cardiovascular, pulmonary, GI, , musculoskeletal, neuro, skin, endocrine and psych systems are negative, except as otherwise noted.      Objective    /76   Pulse 89   Temp 97.9  F (36.6  C) (Oral)   Wt 56.7 kg (125 lb)   SpO2 100%   BMI 24.01 kg/m    Body mass index is 24.01 kg/m .  Physical Exam   GENERAL: healthy, alert and no distress  BREAST: left breast- tender to touch, no skin changes, no  obvious mass noted    (female): normal female external genitalia, normal urethral meatus , vaginal mucosa pink, moist, well rugated, normal cervix, adnexae, and uterus without masses, no IUD strings noticed   PSYCH: mentation appears normal, affect normal/bright            Paty Bettencourt MD, [facility]

## 2022-03-01 NOTE — PATIENT INSTRUCTIONS
[facility] Scheduling to schedule both pelvic ultrasound and mammogram  Phone:[phone]  We will notify you of results when available

## 2022-03-08 ENCOUNTER — HOSPITAL ENCOUNTER (OUTPATIENT)
Dept: MAMMOGRAPHY | Facility: CLINIC | Age: 42
End: 2022-03-08
Attending: FAMILY MEDICINE
Payer: COMMERCIAL

## 2022-03-08 ENCOUNTER — HOSPITAL ENCOUNTER (OUTPATIENT)
Dept: ULTRASOUND IMAGING | Facility: CLINIC | Age: 42
End: 2022-03-08
Attending: FAMILY MEDICINE
Payer: COMMERCIAL

## 2022-03-08 DIAGNOSIS — N64.4 BREAST PAIN: ICD-10-CM

## 2022-03-08 PROCEDURE — 77066 DX MAMMO INCL CAD BI: CPT

## 2022-03-08 PROCEDURE — 76642 ULTRASOUND BREAST LIMITED: CPT | Mod: LT

## 2022-03-09 ENCOUNTER — HOSPITAL ENCOUNTER (OUTPATIENT)
Dept: ULTRASOUND IMAGING | Facility: CLINIC | Age: 42
Discharge: HOME | End: 2022-03-09
Attending: FAMILY MEDICINE | Admitting: FAMILY MEDICINE
Payer: COMMERCIAL

## 2022-03-09 DIAGNOSIS — T83.32XA INTRAUTERINE CONTRACEPTIVE DEVICE THREADS LOST, INITIAL ENCOUNTER: ICD-10-CM

## 2022-03-09 PROCEDURE — 76856 US EXAM PELVIC COMPLETE: CPT

## 2022-03-11 DIAGNOSIS — T83.32XA INTRAUTERINE CONTRACEPTIVE DEVICE THREADS LOST, INITIAL ENCOUNTER: Primary | ICD-10-CM

## 2022-03-28 ENCOUNTER — OFFICE VISIT (OUTPATIENT)
Dept: MIDWIFE SERVICES | Facility: CLINIC | Age: 42
End: 2022-03-28
Payer: COMMERCIAL

## 2022-03-28 VITALS — WEIGHT: 127 LBS | DIASTOLIC BLOOD PRESSURE: 84 MMHG | BODY MASS INDEX: 24.39 KG/M2 | SYSTOLIC BLOOD PRESSURE: 120 MMHG

## 2022-03-28 DIAGNOSIS — T83.32XD INTRAUTERINE CONTRACEPTIVE DEVICE THREADS LOST, SUBSEQUENT ENCOUNTER: Primary | ICD-10-CM

## 2022-03-28 DIAGNOSIS — Z30.433 ENCOUNTER FOR REMOVAL AND REINSERTION OF INTRAUTERINE CONTRACEPTIVE DEVICE: ICD-10-CM

## 2022-03-28 PROCEDURE — 58301 REMOVE INTRAUTERINE DEVICE: CPT | Mod: 53 | Performed by: ADVANCED PRACTICE MIDWIFE

## 2022-03-28 RX ORDER — COPPER 313.4 MG/1
1 INTRAUTERINE DEVICE INTRAUTERINE ONCE
Status: DISCONTINUED
Start: 2022-03-28 | End: 2022-03-28

## 2022-03-28 RX ORDER — COPPER 313.4 MG/1
1 INTRAUTERINE DEVICE INTRAUTERINE ONCE
COMMUNITY
End: 2022-03-28

## 2022-03-28 NOTE — PROGRESS NOTES
SUBJECTIVE:    Subjective: Katrina Barry is a 41 year old presenting for IUD removal and reinsertion.   She requests removal of the IUD because the IUD effectiveness has .    Katrina states that she had her current Paragard IUD placed in  in [place]. She desires to have it removed today and replaced with another Paragard IUD. She recently had a pelvic US because her IUD strings were not visualized with a routine pelvic exam at her annual wellness visit. The pelvic US stated that the IUD was still in the appropriate position.     OBJECTIVE:  B/P: 120/84  Pelvic Exam:  Vulva: No external lesions, normal hair distribution, no adenopathy  Vagina: Moist, pink, no abnormal discharge, well rugated, no lesions  Cervix: smooth, pink, no visible lesions; IUD strings not visible  Uterus: deferred  Ovaries :deferred  Rectal exam: deferred    ASSESSMENT:  1. (Z30.433) Encounter for removal and reinsertion of intrauterine contraceptive device  (primary encounter diagnosis)  Plan: unsuccessful removal of Paragard IUD   - patient to see physician service for IUD removal    2. (T83.32XD) Intrauterine contraceptive device threads lost, subsequent encounter    PROCEDURE:    A speculum exam was performed and the cervix was visualized. The IUD strings were not visualized. A cytobrush was utilized in an attempt to tease out the IUD strings of the cervical os. However, IUD strings were still not visualized. An elongated franklin forceps was introduced into the cervical os in an attempt to grasp the IUD strings. However, the IUD strings were still not able to be grasped or visualized. Therefore, the removal attempt was terminated. The patient was counseled that she should see one of the OB/GYN physicians for an IUD removal with hysteroscopy and reinsertion. Patient agreeable to plan and assisted in scheduling an appointment with the physician service via  services.       ROSARIO Thomason was present with the student who  participated in the service and documentation of the services provided. I have verified the history and personally performed the physical exam and medical decision making as documented by the student and edited by me.    PETER Vilchis, EMMA

## 2022-03-28 NOTE — NURSING NOTE
"Chief Complaint   Patient presents with     IUD     removal and replacement, 3/9/22 US confirmed in place       Initial /84 (BP Location: Left arm, Cuff Size: Adult Regular)   Wt 57.6 kg (127 lb)   LMP 03/22/2022   BMI 24.39 kg/m   Estimated body mass index is 24.39 kg/m  as calculated from the following:    Height as of 12/7/21: 1.537 m (5' 0.5\").    Weight as of this encounter: 57.6 kg (127 lb).  BP completed using cuff size: regular    Questioned patient about current smoking habits.  Pt. has never smoked.      "

## 2022-04-02 DIAGNOSIS — M62.830 SPASM OF BACK MUSCLES: ICD-10-CM

## 2022-04-04 NOTE — TELEPHONE ENCOUNTER
Routing refill request to provider for review/approval because:  Drug not on the [facility] refill protocol     Krysten Campos RN on 4/4/2022 at 11:49 AM

## 2022-04-29 ENCOUNTER — OFFICE VISIT (OUTPATIENT)
Dept: OBGYN | Facility: CLINIC | Age: 42
End: 2022-04-29
Payer: COMMERCIAL

## 2022-04-29 VITALS — HEIGHT: 60 IN | BODY MASS INDEX: 24.8 KG/M2

## 2022-04-29 DIAGNOSIS — Z11.3 SCREEN FOR STD (SEXUALLY TRANSMITTED DISEASE): ICD-10-CM

## 2022-04-29 DIAGNOSIS — Z01.812 PRE-PROCEDURE LAB EXAM: ICD-10-CM

## 2022-04-29 DIAGNOSIS — Z30.433 ENCOUNTER FOR REMOVAL AND REINSERTION OF INTRAUTERINE CONTRACEPTIVE DEVICE: ICD-10-CM

## 2022-04-29 DIAGNOSIS — T83.39XA RETAINED INTRAUTERINE CONTRACEPTIVE DEVICE (IUD): Primary | ICD-10-CM

## 2022-04-29 DIAGNOSIS — Z30.430 ENCOUNTER FOR INSERTION OF PARAGARD IUD: ICD-10-CM

## 2022-04-29 PROBLEM — Z97.5 IUD (INTRAUTERINE DEVICE) IN PLACE: Status: ACTIVE | Noted: 2022-04-29

## 2022-04-29 LAB — HCG IFA URINE: NEGATIVE

## 2022-04-29 PROCEDURE — 58300 INSERT INTRAUTERINE DEVICE: CPT | Performed by: OBSTETRICS & GYNECOLOGY

## 2022-04-29 PROCEDURE — 87591 N.GONORRHOEAE DNA AMP PROB: CPT | Performed by: OBSTETRICS & GYNECOLOGY

## 2022-04-29 PROCEDURE — 84703 CHORIONIC GONADOTROPIN ASSAY: CPT | Performed by: OBSTETRICS & GYNECOLOGY

## 2022-04-29 PROCEDURE — 87491 CHLMYD TRACH DNA AMP PROBE: CPT | Performed by: OBSTETRICS & GYNECOLOGY

## 2022-04-29 PROCEDURE — 58562 HYSTEROSCOPY REMOVE FB: CPT | Performed by: OBSTETRICS & GYNECOLOGY

## 2022-04-29 RX ORDER — COPPER 313.4 MG/1
1 INTRAUTERINE DEVICE INTRAUTERINE ONCE
COMMUNITY

## 2022-04-29 RX ORDER — COPPER 313.4 MG/1
1 INTRAUTERINE DEVICE INTRAUTERINE ONCE
Status: COMPLETED
Start: 2022-04-29 | End: 2022-04-29

## 2022-04-29 RX ADMIN — COPPER 1 EACH: 313.4 INTRAUTERINE DEVICE INTRAUTERINE at 09:44

## 2022-04-29 NOTE — PROGRESS NOTES
I communicated with Pt via [employer]  because Pt speaks no/limited English.    Pt has retained copper IUD inserted in [place] that has been in situ for 10 years and needs a removal due to expiration and reinsertion with a Paragard IUD to continue IUD for contraception.  A prior exam did not reveal the strings, and an U/S did confirm the IUD is in the uterine cavity.  I reviewed the IUD options (Paragard vs Mirena) and she wants to continue with Paragard IUD.    Exam-Vagina WNL, Cx - normal parous Cx, strings not visible at os.  GC/Chlam done.  An attempt to locate strings with Braulio stone forceps was unsuccessful.      PROCEDURE:  Hysteroscopic Removal of Retained Intrauterine Device    Indication:  Retained intrauterine device  Anesthesia:  Paracervical block  Findings: [place] Copper IUD, intrauterine, now removed.    Patient understands the indications/risks/benefits/alternatives and has given informed consent.    Patient was positioned in the dorsal lithotomy position. A single-armed speculum was inserted in the vagina, and a betadine prep performed. A single tooth tenaculum was used to grasp the anterior lip of the cervix. 1% plain lidocaine was injected for a paracervical block.  The cervix was dilated to a #6 Hegar dilator to admit the hysteroscope.  The hysteroscope was inserted under direct visualization through the endocervical canal, and the IUD strings were visualized, grasped with hysteroscopic graspers, and the copper IUD (similar to Paragard but does not have the same copper band configuration) easily removed along with the scope.  The IUD was inspected and found to be intact.  The tenaculum was removed and the cervix and uterus were hemostatic. The speculum was removed. The patient tolerated the procedure well.        PROCEDURE:  IUD Insertion    Indication:  Contraception.    No absolute contraindication to IUD.  The procedure was explained.  Informed consent was obtained.      Procedure in detail:  IUD Insertion    Pelvic exam - Uterus AV, NSSC, no adnexal masses    A sterile speculum was placed in the vagina.  The visualized cervix was painted with Betadine.  No cervical lesions were noted.  The uterus was sounded to 8 cm.  Using sterile technique, the IUD was inserted without difficulty.     Type of IUD:  Paragard     The IUD strings were trimmed to 3 cm.  The patient tolerated the procedure well.     There were no complications and no significant bleeding was noted upon the completion of the exam.        ASSESSMENT:  Encounter Diagnoses   Name Primary?     Retained intrauterine contraceptive device (IUD) Yes     Encounter for removal and reinsertion of intrauterine contraceptive device      Pre-procedure lab exam      Screen for STD (sexually transmitted disease)      Insertion of ParaGard IUD : Due form removal 2032           PLAN:  The patient was instructed on string checks.    Jeancarlos Saunders MD  [facility]

## 2022-04-29 NOTE — NURSING NOTE
Chief Complaint   Patient presents with     Minor Procedure     Hysteroscopic IUD removal        Initial Ht 1.524 m (5')   LMP 03/22/2022 (Exact Date)   BMI 24.80 kg/m   Estimated body mass index is 24.8 kg/m  as calculated from the following:    Height as of this encounter: 1.524 m (5').    Weight as of 3/28/22: 57.6 kg (127 lb).  BP completed using cuff size: regular    Questioned patient about current smoking habits.  Pt. has never smoked.    The following HM Due: NONE    João Mauirce CMA

## 2022-04-30 LAB
C TRACH DNA SPEC QL NAA+PROBE: NEGATIVE
N GONORRHOEA DNA SPEC QL NAA+PROBE: NEGATIVE

## 2022-09-11 ENCOUNTER — HEALTH MAINTENANCE LETTER (OUTPATIENT)
Age: 42
End: 2022-09-11

## 2023-01-23 ENCOUNTER — HEALTH MAINTENANCE LETTER (OUTPATIENT)
Age: 43
End: 2023-01-23

## 2023-02-07 ENCOUNTER — OFFICE VISIT (OUTPATIENT)
Dept: FAMILY MEDICINE | Facility: CLINIC | Age: 43
End: 2023-02-07
Payer: COMMERCIAL

## 2023-02-07 VITALS
HEART RATE: 76 BPM | SYSTOLIC BLOOD PRESSURE: 102 MMHG | TEMPERATURE: 98.2 F | WEIGHT: 127.2 LBS | OXYGEN SATURATION: 100 % | DIASTOLIC BLOOD PRESSURE: 70 MMHG | RESPIRATION RATE: 16 BRPM | BODY MASS INDEX: 24.97 KG/M2 | HEIGHT: 60 IN

## 2023-02-07 DIAGNOSIS — Z12.31 VISIT FOR SCREENING MAMMOGRAM: ICD-10-CM

## 2023-02-07 DIAGNOSIS — E78.2 MIXED HYPERLIPIDEMIA: ICD-10-CM

## 2023-02-07 DIAGNOSIS — Z12.4 CERVICAL CANCER SCREENING: ICD-10-CM

## 2023-02-07 DIAGNOSIS — Z23 NEED FOR PROPHYLACTIC VACCINATION AND INOCULATION AGAINST INFLUENZA: ICD-10-CM

## 2023-02-07 DIAGNOSIS — Z00.00 ROUTINE GENERAL MEDICAL EXAMINATION AT A HEALTH CARE FACILITY: Primary | ICD-10-CM

## 2023-02-07 LAB
ALBUMIN SERPL BCG-MCNC: 4.5 G/DL (ref 3.5–5.2)
ALP SERPL-CCNC: 61 U/L (ref 35–104)
ALT SERPL W P-5'-P-CCNC: 17 U/L (ref 10–35)
ANION GAP SERPL CALCULATED.3IONS-SCNC: 10 MMOL/L (ref 7–15)
AST SERPL W P-5'-P-CCNC: 17 U/L (ref 10–35)
BILIRUB SERPL-MCNC: 0.2 MG/DL
BUN SERPL-MCNC: 12.6 MG/DL (ref 6–20)
CALCIUM SERPL-MCNC: 9.3 MG/DL (ref 8.6–10)
CHLORIDE SERPL-SCNC: 106 MMOL/L (ref 98–107)
CHOLEST SERPL-MCNC: 244 MG/DL
CREAT SERPL-MCNC: 0.48 MG/DL (ref 0.51–0.95)
DEPRECATED HCO3 PLAS-SCNC: 25 MMOL/L (ref 22–29)
GFR SERPL CREATININE-BSD FRML MDRD: >90 ML/MIN/1.73M2
GLUCOSE SERPL-MCNC: 100 MG/DL (ref 70–99)
HDLC SERPL-MCNC: 50 MG/DL
LDLC SERPL CALC-MCNC: 183 MG/DL
NONHDLC SERPL-MCNC: 194 MG/DL
POTASSIUM SERPL-SCNC: 4.5 MMOL/L (ref 3.4–5.3)
PROT SERPL-MCNC: 7.7 G/DL (ref 6.4–8.3)
SODIUM SERPL-SCNC: 141 MMOL/L (ref 136–145)
TRIGL SERPL-MCNC: 53 MG/DL

## 2023-02-07 PROCEDURE — 80053 COMPREHEN METABOLIC PANEL: CPT | Performed by: PHYSICIAN ASSISTANT

## 2023-02-07 PROCEDURE — 90686 IIV4 VACC NO PRSV 0.5 ML IM: CPT | Performed by: PHYSICIAN ASSISTANT

## 2023-02-07 PROCEDURE — 80061 LIPID PANEL: CPT | Performed by: PHYSICIAN ASSISTANT

## 2023-02-07 PROCEDURE — 0134A COVID-19 VACCINE BIVALENT BOOSTER 18+ (MODERNA): CPT | Performed by: PHYSICIAN ASSISTANT

## 2023-02-07 PROCEDURE — 91313 COVID-19 VACCINE BIVALENT BOOSTER 18+ (MODERNA): CPT | Performed by: PHYSICIAN ASSISTANT

## 2023-02-07 PROCEDURE — 87624 HPV HI-RISK TYP POOLED RSLT: CPT | Performed by: PHYSICIAN ASSISTANT

## 2023-02-07 PROCEDURE — 90471 IMMUNIZATION ADMIN: CPT | Performed by: PHYSICIAN ASSISTANT

## 2023-02-07 PROCEDURE — 36415 COLL VENOUS BLD VENIPUNCTURE: CPT | Performed by: PHYSICIAN ASSISTANT

## 2023-02-07 PROCEDURE — G0145 SCR C/V CYTO,THINLAYER,RESCR: HCPCS | Performed by: PHYSICIAN ASSISTANT

## 2023-02-07 PROCEDURE — 99396 PREV VISIT EST AGE 40-64: CPT | Mod: 25 | Performed by: PHYSICIAN ASSISTANT

## 2023-02-07 PROCEDURE — 99213 OFFICE O/P EST LOW 20 MIN: CPT | Mod: 25 | Performed by: PHYSICIAN ASSISTANT

## 2023-02-07 RX ORDER — ROSUVASTATIN CALCIUM 10 MG/1
TABLET, COATED ORAL
Qty: 90 TABLET | Refills: 2 | Status: SHIPPED | OUTPATIENT
Start: 2023-02-07 | End: 2024-02-15

## 2023-02-07 ASSESSMENT — ENCOUNTER SYMPTOMS
ABDOMINAL PAIN: 0
NERVOUS/ANXIOUS: 0
DIZZINESS: 0
SHORTNESS OF BREATH: 0
WEAKNESS: 0
FREQUENCY: 0
BREAST MASS: 1
PARESTHESIAS: 0
COUGH: 0
SORE THROAT: 0
PALPITATIONS: 0
HEMATURIA: 0
HEMATOCHEZIA: 0
HEARTBURN: 0
CHILLS: 0
EYE PAIN: 0
CONSTIPATION: 0
JOINT SWELLING: 0
DYSURIA: 0
DIARRHEA: 0
NAUSEA: 0
MYALGIAS: 0
ARTHRALGIAS: 0
HEADACHES: 0
FEVER: 0

## 2023-02-07 ASSESSMENT — PAIN SCALES - GENERAL: PAINLEVEL: NO PAIN (0)

## 2023-02-07 NOTE — PROGRESS NOTES
SUBJECTIVE:   CC: Katrina is an 42 year old who presents for preventive health visit.   Patient has been advised of split billing requirements and indicates understanding: Yes  Healthy Habits:     Getting at least 3 servings of Calcium per day:  Yes    Bi-annual eye exam:  NO    Dental care twice a year:  NO    Sleep apnea or symptoms of sleep apnea:  None    Diet:  Low fat/cholesterol and Gluten-free/reduced    Frequency of exercise:  None    Taking medications regularly:  Yes    Medication side effects:  None    PHQ-2 Total Score: 0    Additional concerns today:  No    Patient here for physical today.  No concerns  Due for PAP- has period currently but states light bleeding.  Had IUD replaced by GYN last year.  No concerns.    Hyperlipidemia Follow-Up      Are you regularly taking any medication or supplement to lower your cholesterol?   Yes-  crestor    Are you having muscle aches or other side effects that you think could be caused by your cholesterol lowering medication?  No      Today's PHQ-2 Score:   PHQ-2 ( 1999 Pfizer) 2/7/2023   Q1: Little interest or pleasure in doing things 0   Q2: Feeling down, depressed or hopeless 0   PHQ-2 Score 0   PHQ-2 Total Score (12-17 Years)- Positive if 3 or more points; Administer PHQ-A if positive -   Q1: Little interest or pleasure in doing things Not at all   Q2: Feeling down, depressed or hopeless Not at all   PHQ-2 Score 0           Social History     Tobacco Use     Smoking status: Never     Smokeless tobacco: Never   Substance Use Topics     Alcohol use: No     Alcohol/week: 0.0 standard drinks     If you drink alcohol do you typically have >3 drinks per day or >7 drinks per week? No    Alcohol Use 2/7/2023   Prescreen: >3 drinks/day or >7 drinks/week? No   Prescreen: >3 drinks/day or >7 drinks/week? -   No flowsheet data found.    Reviewed orders with patient.  Reviewed health maintenance and updated orders accordingly - Yes  Lab work is in process  Labs reviewed in  EPIC    Breast Cancer Screening:  Any new diagnosis of family breast, ovarian, or bowel cancer? No    FHS-7:   Breast CA Risk Assessment (FHS-7) 3/8/2022   Did any of your first-degree relatives have breast or ovarian cancer? No   Did any of your relatives have bilateral breast cancer? No   Did any man in your family have breast cancer? No   Did any woman in your family have breast and ovarian cancer? No   Did any woman in your family have breast cancer before age 50 y? No   Do you have 2 or more relatives with breast and/or ovarian cancer? No   Do you have 2 or more relatives with breast and/or bowel cancer? No       Mammogram Screening - Offered annual screening and updated Health Maintenance for [place] plan based on risk factor consideration    Pertinent mammograms are reviewed under the imaging tab.    History of abnormal Pap smear: NO - age 30-65 PAP every 5 years with negative HPV co-testing recommended  PAP / HPV Latest Ref Rng & Units 2/23/2018 1/10/2017   PAP (Historical) - NIL NIL   HPV16 NEG:Negative Negative Negative   HPV18 NEG:Negative Negative Negative   HRHPV NEG:Negative Negative Negative     Reviewed and updated as needed this visit by clinical staff   Tobacco  Allergies  Meds  Problems  Med Hx  Surg Hx  Fam Hx          Reviewed and updated as needed this visit by Provider                     Review of Systems   Constitutional: Negative for chills and fever.   HENT: Negative for congestion, ear pain, hearing loss and sore throat.    Eyes: Negative for pain and visual disturbance.   Respiratory: Negative for cough and shortness of breath.    Cardiovascular: Negative for chest pain, palpitations and peripheral edema.   Gastrointestinal: Negative for abdominal pain, constipation, diarrhea, heartburn, hematochezia and nausea.   Breasts:  Positive for breast mass. Negative for tenderness and discharge.   Genitourinary: Negative for dysuria, frequency, genital sores, hematuria, pelvic pain,  "urgency and vaginal discharge.   Musculoskeletal: Negative for arthralgias, joint swelling and myalgias.   Skin: Negative for rash.   Neurological: Negative for dizziness, weakness, headaches and paresthesias.   Psychiatric/Behavioral: Negative for mood changes. The patient is not nervous/anxious.           OBJECTIVE:   /70 (BP Location: Right arm, Patient Position: Sitting, Cuff Size: Adult Regular)   Pulse 76   Temp 98.2  F (36.8  C) (Oral)   Resp 16   Ht 1.518 m (4' 11.75\")   Wt 57.7 kg (127 lb 3.2 oz)   LMP 02/03/2023   SpO2 100%   BMI 25.05 kg/m    Physical Exam  GENERAL: healthy, alert and no distress  EYES: Eyes grossly normal to inspection, PERRL and conjunctivae and sclerae normal  HENT: ear canals and TM's normal, nose and mouth without ulcers or lesions  NECK: no adenopathy, no asymmetry, masses, or scars and thyroid normal to palpation  RESP: lungs clear to auscultation - no rales, rhonchi or wheezes  BREAST: normal without masses, tenderness or nipple discharge and no palpable axillary masses or adenopathy  CV: regular rate and rhythm, normal S1 S2, no S3 or S4, no murmur, click or rub, no peripheral edema and peripheral pulses strong  ABDOMEN: soft, nontender, no hepatosplenomegaly, no masses and bowel sounds normal   (female): normal female external genitalia, normal urethral meatus, vaginal mucosa pink, moist, well rugated, and normal cervix/adnexa/uterus without masses or discharge  MS: no gross musculoskeletal defects noted, no edema  SKIN: no suspicious lesions or rashes  NEURO: Normal strength and tone, mentation intact and speech normal  PSYCH: mentation appears normal, affect normal/bright    Diagnostic Test Results:  Labs reviewed in Epic  Results for orders placed or performed in visit on 02/07/23   Lipid panel reflex to direct LDL Fasting     Status: Abnormal   Result Value Ref Range    Cholesterol 244 (H) <200 mg/dL    Triglycerides 53 <150 mg/dL    Direct Measure HDL 50 " >=50 mg/dL    LDL Cholesterol Calculated 183 (H) <=100 mg/dL    Non HDL Cholesterol 194 (H) <130 mg/dL    Narrative    Cholesterol  Desirable:  <200 mg/dL    Triglycerides  Normal:  Less than 150 mg/dL  Borderline High:  150-199 mg/dL  High:  200-499 mg/dL  Very High:  Greater than or equal to 500 mg/dL    Direct Measure HDL  Female:  Greater than or equal to 50 mg/dL   Male:  Greater than or equal to 40 mg/dL    LDL Cholesterol  Desirable:  <100mg/dL  Above Desirable:  100-129 mg/dL   Borderline High:  130-159 mg/dL   High:  160-189 mg/dL   Very High:  >= 190 mg/dL    Non HDL Cholesterol  Desirable:  130 mg/dL  Above Desirable:  130-159 mg/dL  Borderline High:  160-189 mg/dL  High:  190-219 mg/dL  Very High:  Greater than or equal to 220 mg/dL   Comprehensive metabolic panel (BMP + Alb, Alk Phos, ALT, AST, Total. Bili, TP)     Status: Abnormal   Result Value Ref Range    Sodium 141 136 - 145 mmol/L    Potassium 4.5 3.4 - 5.3 mmol/L    Chloride 106 98 - 107 mmol/L    Carbon Dioxide (CO2) 25 22 - 29 mmol/L    Anion Gap 10 7 - 15 mmol/L    Urea Nitrogen 12.6 6.0 - 20.0 mg/dL    Creatinine 0.48 (L) 0.51 - 0.95 mg/dL    Calcium 9.3 8.6 - 10.0 mg/dL    Glucose 100 (H) 70 - 99 mg/dL    Alkaline Phosphatase 61 35 - 104 U/L    AST 17 10 - 35 U/L    ALT 17 10 - 35 U/L    Protein Total 7.7 6.4 - 8.3 g/dL    Albumin 4.5 3.5 - 5.2 g/dL    Bilirubin Total 0.2 <=1.2 mg/dL    GFR Estimate >90 >60 mL/min/1.73m2   HPV High Risk Types DNA Cervical     Status: None   Result Value Ref Range    Other HR HPV Negative Negative    HPV16 DNA Negative Negative    HPV18 DNA Negative Negative    FINAL DIAGNOSIS       This patient's sample is negative for HPV DNA.        This test was developed and its performance characteristics determined by the [facility]. It has not been cleared or approved by the FDA. The laboratory is regulated under CLIA as qualified to perform high-complexity  testing. This test is used for clinical purposes. It should not be regarded as investigational or for research.    METHODOLOGY: The Roche Juan 4800 system uses automated extraction, simultaneous amplification of HPV (L1 region) and beta-globin, followed by real time detection of fluorescent labeled HPV and beta globin using specific oligonucleotide probes. The test specifically identifies types HPV 16 DNA and HPV 18 DNA while concurrently detecting the rest of the high risk types (31, 33, 35, 39, 45, 51, 52, 56, 58, 59, 66 or 68).    COMMENTS: This test is not intended for use as a screening device for woman under age 30 with normal cervical cytology. Results should be correlated with cytologic and histologic findings. Close clinical followup is recommended.       Pap Screen with HPV - recommended age 30 - 65 years     Status: None   Result Value Ref Range    Interpretation        Negative for Intraepithelial Lesion or Malignancy (NILM)    Comment         Papanicolaou Test Limitations:  Cervical cytology is a screening test with limited sensitivity, and regular screening is critical for cancer prevention.  Pap tests are primarily effective for the diagnosis/prevention of squamous cell carcinoma, not adenocarcinoma or other cancers.        Specimen Adequacy       Satisfactory for evaluation, endocervical/transformation zone component present    Clinical Information       IUD      Reflex Testing Yes regardless of result     Previous Abnormal?       No      Performing Labs       The technical component of this testing was completed at [facility]         ASSESSMENT/PLAN:   (Z00.00) Routine general medical examination at a health care facility  (primary encounter diagnosis)  Comment:   Plan: Lipid panel reflex to direct LDL Fasting,         Comprehensive metabolic panel (BMP + Alb, Alk         Phos, ALT, AST, Total. Bili, TP)        Fasting labs    (Z12.4)  Cervical cancer screening  Comment:   Plan: Pap Screen with HPV - recommended age 30 - 65         years, HPV Hold (Lab Only), HPV High Risk Types        DNA Cervical            (E78.2) Mixed hyperlipidemia  Comment:   Plan: rosuvastatin (CRESTOR) 10 MG tablet, Lipid         panel reflex to direct LDL Fasting,         Comprehensive metabolic panel (BMP + Alb, Alk         Phos, ALT, AST, Total. Bili, TP)        Cholesterol numbers don't change much.  ASCVD score is quite low.  Will discuss with MTM re benefit of continuing statin.    (Z23) Need for prophylactic vaccination and inoculation against influenza  Comment:   Plan: INFLUENZA VACCINE IM > 6 MONTHS VALENT IIV4         (AFLURIA/FLUZONE)            (Z12.31) Visit for screening mammogram  Comment:   Plan: *MA Screening Digital Bilateral              COUNSELING:  Reviewed preventive health counseling, as reflected in patient instructions       Regular exercise       Healthy diet/nutrition        She reports that she has never smoked. She has never used smokeless tobacco.      HERMES [facility]

## 2023-02-07 NOTE — NURSING NOTE
Covid Moderna bivalent booster and Flu shot given.  See Immunization section for details.  Lisa Magill, CMA

## 2023-02-09 LAB
BKR LAB AP GYN ADEQUACY: NORMAL
BKR LAB AP GYN INTERPRETATION: NORMAL
BKR LAB AP HPV REFLEX: NORMAL
BKR LAB AP PREVIOUS ABNORMAL: NORMAL
PATH REPORT.COMMENTS IMP SPEC: NORMAL
PATH REPORT.COMMENTS IMP SPEC: NORMAL
PATH REPORT.RELEVANT HX SPEC: NORMAL

## 2023-02-14 LAB
HUMAN PAPILLOMA VIRUS 16 DNA: NEGATIVE
HUMAN PAPILLOMA VIRUS 18 DNA: NEGATIVE
HUMAN PAPILLOMA VIRUS FINAL DIAGNOSIS: NORMAL
HUMAN PAPILLOMA VIRUS OTHER HR: NEGATIVE

## 2023-07-17 NOTE — MR AVS SNAPSHOT
"              After Visit Summary   2/23/2018    Katrina Barry    MRN: [id number]           Patient Information     Date Of Birth          1980        Visit Information        Provider Department      2/23/2018 10:30 AM Roe Gibson PA-C; [employer] [facility] [facility]        Today's Diagnoses     Abdominal pain, generalized    -  1    Vaginal discharge          Care Instructions    Use vaginal gel nightly for 5 nights.  Bring in stool sample to check for stomach infection.      If these things do not help you feel better we will check with a pelvic ultrasound on the IUD.          Follow-ups after your visit        Follow-up notes from your care team     Return for Follow up in one week, bring in stool sample.      Future tests that were ordered for you today     Open Future Orders        Priority Expected Expires Ordered    H Pylori antigen stool Routine  3/25/2018 2/23/2018            Who to contact     If you have questions or need follow up information about today's clinic visit or your schedule please contact [facility] directly at [phone].  Normal or non-critical lab and imaging results will be communicated to you by [employer], letter or phone within 4 business days after the clinic has received the results. If you do not hear from us within 7 days, please contact the clinic through [employer] or phone. If you have a critical or abnormal lab result, we will notify you by phone as soon as possible.  Submit refill requests through [employer] or call your pharmacy and they will forward the refill request to us. Please allow 3 business days for your refill to be completed.          Additional Information About Your Visit        Flat[employer] Information     [employer] lets you send messages to your doctor, view your test results, renew your prescriptions, schedule appointments and more. To sign up, go to [email or web address] . Click on \"Log in\" on the left side of the " "screen, which will take you to the Welcome page. Then click on \"Sign up Now\" on the right side of the page.     You will be asked to enter the access code listed below, as well as some personal information. Please follow the directions to create your username and password.     Your access code is: [email or web address]  Expires: 2018 12:12 PM     Your access code will  in 90 days. If you need help or a new code, please call your [place] clinic or [phone].        Care EveryWhere ID     This is your Care EveryWhere ID. This could be used by other organizations to access your [place] medical records  [id number]        Your Vitals Were     Pulse Respirations Last Period Breastfeeding? BMI (Body Mass Index)       74 16 2018 No 22.48 kg/m2        Blood Pressure from Last 3 Encounters:   18 (!) 118/94   18 104/78   17 96/70    Weight from Last 3 Encounters:   18 118 lb (53.5 kg)   18 116 lb (52.6 kg)   17 118 lb (53.5 kg)              We Performed the Following     UA reflex to Microscopic and Culture     Wet prep          Today's Medication Changes          These changes are accurate as of 18 12:04 PM.  If you have any questions, ask your nurse or doctor.               Start taking these medicines.        Dose/Directions    metroNIDAZOLE 0.75 % vaginal gel   Commonly known as:  METROGEL   Used for:  Vaginal discharge   Started by:  Roe Gibson PA-C        Dose:  1 applicator   Place 1 applicator (5 g) vaginally At Bedtime for 5 days   Quantity:  70 g   Refills:  0         Stop taking these medicines if you haven't already. Please contact your care team if you have questions.     atorvastatin 10 MG tablet   Commonly known as:  LIPITOR   Stopped by:  Roe Gibson PA-C                Where to get your medicines      These medications were sent to [facility]/pharmacy #[id number] - [place], [place] -   [address] [facility] [address]     " Phone:  [phone]     metroNIDAZOLE 0.75 % vaginal gel                Primary Care Provider Office Phone # Fax #    Roe Gibson PA-C [phone]       [id number]  [facility] [address]        Equal Access to Services     JC ARNOLD : Hadii aad ku hadasho Soomaali, waaxda luqadaha, qaybta kaalmada adeegyada, waxay idiin hayaan adeeg red laluis armando angel. So [facility] [phone].    ATENCIÓN: Si habla español, tiene a conn disposición servicios gratuitos de asistencia lingüística. Llame al [phone].    We comply with applicable federal civil rights laws and [place] laws. We do not discriminate on the basis of race, color, national origin, age, disability, sex, sexual orientation, or gender identity.            Thank you!     Thank you for choosing [facility]  for your care. Our goal is always to provide you with excellent care. Hearing back from our patients is one way we can continue to improve our services. Please take a few minutes to complete the written survey that you may receive in the mail after your visit with us. Thank you!             Your Updated Medication List - Protect others around you: Learn how to safely use, store and throw away your medicines at www.disposemymeds.org.          This list is accurate as of 2/23/18 12:04 PM.  Always use your most recent med list.                   Brand Name Dispense Instructions for use Diagnosis    cetirizine 10 MG tablet    zyrTEC    90 tablet    Take 1 tablet (10 mg) by mouth every evening    Eustachian tube dysfunction, bilateral       fluticasone 50 MCG/ACT spray    FLONASE    1 Bottle    Spray 1-2 sprays into both nostrils daily    Eustachian tube dysfunction, bilateral       metroNIDAZOLE 0.75 % vaginal gel    METROGEL    70 g    Place 1 applicator (5 g) vaginally At Bedtime for 5 days    Vaginal discharge       simvastatin 20 MG tablet    ZOCOR    30 tablet    Take 1 tablet (20 mg) by mouth At Bedtime    Pure  hypercholesterolemia          Attending Attestation (For Attendings USE Only)...

## 2024-02-15 ENCOUNTER — OFFICE VISIT (OUTPATIENT)
Dept: FAMILY MEDICINE | Facility: CLINIC | Age: 44
End: 2024-02-15
Payer: COMMERCIAL

## 2024-02-15 VITALS
OXYGEN SATURATION: 100 % | SYSTOLIC BLOOD PRESSURE: 124 MMHG | HEART RATE: 73 BPM | HEIGHT: 60 IN | WEIGHT: 126.1 LBS | RESPIRATION RATE: 15 BRPM | BODY MASS INDEX: 24.76 KG/M2 | TEMPERATURE: 97.7 F | DIASTOLIC BLOOD PRESSURE: 72 MMHG

## 2024-02-15 DIAGNOSIS — E78.2 MIXED HYPERLIPIDEMIA: ICD-10-CM

## 2024-02-15 DIAGNOSIS — Z00.00 ROUTINE GENERAL MEDICAL EXAMINATION AT A HEALTH CARE FACILITY: Primary | ICD-10-CM

## 2024-02-15 DIAGNOSIS — Z12.31 VISIT FOR SCREENING MAMMOGRAM: ICD-10-CM

## 2024-02-15 LAB
ALBUMIN SERPL BCG-MCNC: 4.4 G/DL (ref 3.5–5.2)
ALP SERPL-CCNC: 59 U/L (ref 40–150)
ALT SERPL W P-5'-P-CCNC: 23 U/L (ref 0–50)
ANION GAP SERPL CALCULATED.3IONS-SCNC: 8 MMOL/L (ref 7–15)
AST SERPL W P-5'-P-CCNC: 20 U/L (ref 0–45)
BILIRUB SERPL-MCNC: 0.4 MG/DL
BUN SERPL-MCNC: 14.6 MG/DL (ref 6–20)
CALCIUM SERPL-MCNC: 9.3 MG/DL (ref 8.6–10)
CHLORIDE SERPL-SCNC: 104 MMOL/L (ref 98–107)
CHOLEST SERPL-MCNC: 276 MG/DL
CREAT SERPL-MCNC: 0.52 MG/DL (ref 0.51–0.95)
DEPRECATED HCO3 PLAS-SCNC: 27 MMOL/L (ref 22–29)
EGFRCR SERPLBLD CKD-EPI 2021: >90 ML/MIN/1.73M2
ERYTHROCYTE [DISTWIDTH] IN BLOOD BY AUTOMATED COUNT: 15.9 % (ref 10–15)
FASTING STATUS PATIENT QL REPORTED: YES
GLUCOSE SERPL-MCNC: 99 MG/DL (ref 70–99)
HCT VFR BLD AUTO: 40.5 % (ref 35–47)
HDLC SERPL-MCNC: 52 MG/DL
HGB BLD-MCNC: 12.5 G/DL (ref 11.7–15.7)
LDLC SERPL CALC-MCNC: 203 MG/DL
MCH RBC QN AUTO: 21.6 PG (ref 26.5–33)
MCHC RBC AUTO-ENTMCNC: 30.9 G/DL (ref 31.5–36.5)
MCV RBC AUTO: 70 FL (ref 78–100)
NONHDLC SERPL-MCNC: 224 MG/DL
PLATELET # BLD AUTO: 203 10E3/UL (ref 150–450)
POTASSIUM SERPL-SCNC: 4.1 MMOL/L (ref 3.4–5.3)
PROT SERPL-MCNC: 7.5 G/DL (ref 6.4–8.3)
RBC # BLD AUTO: 5.8 10E6/UL (ref 3.8–5.2)
SODIUM SERPL-SCNC: 139 MMOL/L (ref 135–145)
TRIGL SERPL-MCNC: 107 MG/DL
WBC # BLD AUTO: 4.8 10E3/UL (ref 4–11)

## 2024-02-15 PROCEDURE — 36415 COLL VENOUS BLD VENIPUNCTURE: CPT | Performed by: PHYSICIAN ASSISTANT

## 2024-02-15 PROCEDURE — 85027 COMPLETE CBC AUTOMATED: CPT | Performed by: PHYSICIAN ASSISTANT

## 2024-02-15 PROCEDURE — 91320 SARSCV2 VAC 30MCG TRS-SUC IM: CPT | Performed by: PHYSICIAN ASSISTANT

## 2024-02-15 PROCEDURE — 90471 IMMUNIZATION ADMIN: CPT | Performed by: PHYSICIAN ASSISTANT

## 2024-02-15 PROCEDURE — 90480 ADMN SARSCOV2 VAC 1/ONLY CMP: CPT | Performed by: PHYSICIAN ASSISTANT

## 2024-02-15 PROCEDURE — 90686 IIV4 VACC NO PRSV 0.5 ML IM: CPT | Performed by: PHYSICIAN ASSISTANT

## 2024-02-15 PROCEDURE — 80061 LIPID PANEL: CPT | Performed by: PHYSICIAN ASSISTANT

## 2024-02-15 PROCEDURE — 80053 COMPREHEN METABOLIC PANEL: CPT | Performed by: PHYSICIAN ASSISTANT

## 2024-02-15 PROCEDURE — 99396 PREV VISIT EST AGE 40-64: CPT | Mod: 25 | Performed by: PHYSICIAN ASSISTANT

## 2024-02-15 RX ORDER — ROSUVASTATIN CALCIUM 10 MG/1
TABLET, COATED ORAL
Qty: 90 TABLET | Refills: 2 | Status: SHIPPED | OUTPATIENT
Start: 2024-02-15 | End: 2024-02-15

## 2024-02-15 SDOH — HEALTH STABILITY: PHYSICAL HEALTH: ON AVERAGE, HOW MANY MINUTES DO YOU ENGAGE IN EXERCISE AT THIS LEVEL?: 30 MIN

## 2024-02-15 SDOH — HEALTH STABILITY: PHYSICAL HEALTH: ON AVERAGE, HOW MANY DAYS PER WEEK DO YOU ENGAGE IN MODERATE TO STRENUOUS EXERCISE (LIKE A BRISK WALK)?: 3 DAYS

## 2024-02-15 ASSESSMENT — SOCIAL DETERMINANTS OF HEALTH (SDOH): HOW OFTEN DO YOU GET TOGETHER WITH FRIENDS OR RELATIVES?: THREE TIMES A WEEK

## 2024-02-15 ASSESSMENT — PAIN SCALES - GENERAL: PAINLEVEL: NO PAIN (0)

## 2024-02-15 NOTE — PATIENT INSTRUCTIONS
Preventive Care Advice   This is general advice given by our system to help you stay healthy. However, your care team may have specific advice just for you. Please talk to your care team about your preventive care needs.  Nutrition  Eat 5 or more servings of fruits and vegetables each day.  Try wheat bread, brown rice and whole grain pasta (instead of white bread, rice, and pasta).  Get enough calcium and vitamin D. Check the label on foods and aim for 100% of the RDA (recommended daily allowance).  Lifestyle  Exercise at least 150 minutes each week  (30 minutes a day, 5 days a week).  Do muscle strengthening activities 2 days a week. These help control your weight and prevent disease.  No smoking.  Wear sunscreen to prevent skin cancer.  Have a dental exam and cleaning every 6 months.  Yearly exams  See your health care team every year to talk about:  Any changes in your health.  Any medicines your care team has prescribed.  Preventive care, family planning, and ways to prevent chronic diseases.  Shots (vaccines)   HPV shots (up to age 26), if you've never had them before.  Hepatitis B shots (up to age 59), if you've never had them before.  COVID-19 shot: Get this shot when it's due.  Flu shot: Get a flu shot every year.  Tetanus shot: Get a tetanus shot every 10 years.  Pneumococcal, hepatitis A, and RSV shots: Ask your care team if you need these based on your risk.  Shingles shot (for age 50 and up)  General health tests  Diabetes screening:  Starting at age 35, Get screened for diabetes at least every 3 years.  If you are younger than age 35, ask your care team if you should be screened for diabetes.  Cholesterol test: At age 39, start having a cholesterol test every 5 years, or more often if advised.  Bone density scan (DEXA): At age 50, ask your care team if you should have this scan for osteoporosis (brittle bones).  Hepatitis C: Get tested at least once in your life.  STIs (sexually transmitted  infections)  Before age 24: Ask your care team if you should be screened for STIs.  After age 24: Get screened for STIs if you're at risk. You are at risk for STIs (including HIV) if:  You are sexually active with more than one person.  You don't use condoms every time.  You or a partner was diagnosed with a sexually transmitted infection.  If you are at risk for HIV, ask about PrEP medicine to prevent HIV.  Get tested for HIV at least once in your life, whether you are at risk for HIV or not.  Cancer screening tests  Cervical cancer screening: If you have a cervix, begin getting regular cervical cancer screening tests starting at age 21.  Breast cancer scan (mammogram): If you've ever had breasts, begin having regular mammograms starting at age 40. This is a scan to check for breast cancer.  Colon cancer screening: It is important to start screening for colon cancer at age 45.  Have a colonoscopy test every 10 years (or more often if you're at risk) Or, ask your provider about stool tests like a FIT test every year or Cologuard test every 3 years.  To learn more about your testing options, visit:   https://www.TekTrak/431046.pdf.  For help making a decision, visit:   https://bit.ly/fw58543.  Prostate cancer screening test: If you have a prostate, ask your care team if a prostate cancer screening test (PSA) at age 55 is right for you.  Lung cancer screening: If you are a current or former smoker ages 50 to 80, ask your care team if ongoing lung cancer screenings are right for you.  For informational purposes only. Not to replace the advice of your health care provider. Copyright   2023 [facility]. All rights reserved. Clinically reviewed by the [facility] Transitions Program. [employer] [id number] - REV 01/24.    Eating Healthy Foods: Care Instructions  With every meal, you can make healthy food choices. Try to eat a variety of fruits, vegetables, whole grains, lean proteins, and low-fat dairy  "products. This can help you get the right balance of nutrients, including vitamins and minerals. Small changes add up over time. You can start by adding one healthy food to your meals each day.    Try to make half your plate fruits and vegetables, one-fourth whole grains, and one-fourth lean proteins. Try including dairy with your meals.   Eat more fruits and vegetables. Try to have them with most meals and snacks.   Foods for healthy eating    Fruits    These can be fresh, frozen, canned, or dried.  Try to choose whole fruit rather than fruit juice.  Eat a variety of colors.    Vegetables    These can be fresh, frozen, canned, or dried.  Beans, peas, and lentils count too.    Whole grains    Choose whole-grain breads, cereals, and noodles.  Try brown rice.    Lean proteins    These can include lean meat, poultry, fish, and eggs.  You can also have tofu, beans, peas, lentils, nuts, and seeds.    Dairy    Try milk, yogurt, and cheese.  Choose low-fat or fat-free when you can.  If you need to, use lactose-free milk or fortified plant-based milk products, such as soy milk.    Water    Drink water when you're thirsty.  Limit sugar-sweetened drinks, including soda, fruit drinks, and sports drinks.  Where can you learn more?  Go to https://www.[facility].net/patiented  Enter T756 in the search box to learn more about \"Eating Healthy Foods: Care Instructions.\"  Current as of: February 28, 2023               Content Version: 13.8    1581-8142 [employer].   Care instructions adapted under license by your healthcare professional. If you have questions about a medical condition or this instruction, always ask your healthcare professional. [employer], [employer] disclaims any warranty or liability for your use of this information.      "

## 2024-02-15 NOTE — PROGRESS NOTES
Preventive Care Visit  [facility]  Roe Gibson PA-C, Family Medicine  Feb 15, 2024    Assessment & Plan     Routine general medical examination at a health care facility  Will get fasting labs today.  - Lipid panel reflex to direct LDL Fasting; Future  - Comprehensive metabolic panel (BMP + Alb, Alk Phos, ALT, AST, Total. Bili, TP); Future  - CBC with platelets; Future  - Lipid panel reflex to direct LDL Fasting  - Comprehensive metabolic panel (BMP + Alb, Alk Phos, ALT, AST, Total. Bili, TP)  - CBC with platelets    Visit for screening mammogram    - MA SCREENING DIGITAL BILAT - Future  (s+30); Future    Mixed hyperlipidemia  Refill pending results, checking labs today.  - Lipid panel reflex to direct LDL Fasting; Future  - Comprehensive metabolic panel (BMP + Alb, Alk Phos, ALT, AST, Total. Bili, TP); Future  - Lipid panel reflex to direct LDL Fasting  - Comprehensive metabolic panel (BMP + Alb, Alk Phos, ALT, AST, Total. Bili, TP)              Counseling  Appropriate preventive services were discussed with this patient, including applicable screening as appropriate for fall prevention, nutrition, physical activity, Tobacco-use cessation, weight loss and cognition.  Checklist reviewing preventive services available has been given to the patient.  Reviewed patient's diet, addressing concerns and/or questions.   She is at risk for lack of exercise and has been provided with information to increase physical activity for the benefit of her well-being.   She is at risk for psychosocial distress and has been provided with information to reduce risk.             Subjective   Thi is a 43 year old, presenting for the following:  Physical        2/15/2024     9:49 AM   Additional Questions   Roomed by Shakila RAPP LPN        Health Care Directive  Patient does not have a Health Care Directive or Living Will: Discussed advance care planning with patient; however, patient declined at this  time.    HPI  Skin- white spots forehead, a little itchy.  Has been using a cream she has at home but not sure what it is called.      Hyperlipidemia Follow-Up    Are you regularly taking any medication or supplement to lower your cholesterol?   Yes- crestor 10mg  Are you having muscle aches or other side effects that you think could be caused by your cholesterol lowering medication?  No         No data to display                  2/7/2023   Nutrition   Three or more servings of calcium each day? Yes   Diet: low fat/cholesterol    gluten- free/reduced         2/7/2023   Exercise   Frequency of exercise: None            2/7/2023   Dental   Dentist two times every year? No          No data to display                  Today's PHQ-2 Score:       2/15/2024     9:59 AM   PHQ-2 ( 1999 Pfizer)   Q1: Little interest or pleasure in doing things 0   Q2: Feeling down, depressed or hopeless 0   PHQ-2 Score 0   Q1: Little interest or pleasure in doing things Not at all   Q2: Feeling down, depressed or hopeless Not at all   PHQ-2 Score 0           2/7/2023   Substance Use   Alcohol more than 3/day or more than 7/wk No     Social History     Tobacco Use    Smoking status: Never    Smokeless tobacco: Never   Vaping Use    Vaping Use: Never used   Substance Use Topics    Alcohol use: No     Alcohol/week: 0.0 standard drinks of alcohol    Drug use: No            No data to display                 Mammogram Screening - Mammogram every 1-2 years updated in Health Maintenance based on mutual decision making      History of abnormal Pap smear: NO - age 30-65 PAP every 5 years with negative HPV co-testing recommended        Latest Ref Rng & Units 2/7/2023    10:40 AM 2/23/2018     3:53 PM 2/23/2018     1:12 PM   PAP / HPV   PAP  Negative for Intraepithelial Lesion or Malignancy (NILM)      PAP (Historical)    NIL    HPV 16 DNA Negative Negative  Negative     HPV 18 DNA Negative Negative  Negative     Other HR HPV Negative Negative   "Negative       The 10-year ASCVD risk score (Zoila DEMPSEY, et al., 2019) is: 1.1%    Values used to calculate the score:      Age: 43 years      Sex: Female      Is Non- : No      Diabetic: No      Tobacco smoker: No      Systolic Blood Pressure: 124 mmHg      Is BP treated: No      HDL Cholesterol: 50 mg/dL      Total Cholesterol: 244 mg/dL         No data to display                 Reviewed and updated as needed this visit by Provider                          Review of Systems  Constitutional, HEENT, cardiovascular, pulmonary, gi and gu systems are negative, except as otherwise noted.     Objective    Exam  /72   Pulse 73   Temp 97.7  F (36.5  C) (Oral)   Resp 15   Ht 1.534 m (5' 0.4\")   Wt 57.2 kg (126 lb 1.6 oz)   SpO2 100%   BMI 24.30 kg/m     Estimated body mass index is 24.3 kg/m  as calculated from the following:    Height as of this encounter: 1.534 m (5' 0.4\").    Weight as of this encounter: 57.2 kg (126 lb 1.6 oz).    Physical Exam  GENERAL: alert and no distress  EYES: Eyes grossly normal to inspection, PERRL and conjunctivae and sclerae normal  HENT: ear canals and TM's normal, nose and mouth without ulcers or lesions  NECK: no adenopathy, no asymmetry, masses, or scars  RESP: lungs clear to auscultation - no rales, rhonchi or wheezes  CV: regular rate and rhythm, normal S1 S2, no S3 or S4, no murmur, click or rub, no peripheral edema  ABDOMEN: soft, nontender, no hepatosplenomegaly, no masses and bowel sounds normal  MS: no gross musculoskeletal defects noted, no edema  SKIN: no suspicious lesions or rashes  NEURO: Normal strength and tone, mentation intact and speech normal  PSYCH: mentation appears normal, affect normal/bright      Signed Electronically by: Roe Gibson PA-C    "

## 2024-02-19 ENCOUNTER — APPOINTMENT (OUTPATIENT)
Dept: INTERPRETER SERVICES | Facility: CLINIC | Age: 44
End: 2024-02-19
Payer: COMMERCIAL

## 2024-02-19 RX ORDER — ROSUVASTATIN CALCIUM 20 MG/1
20 TABLET, COATED ORAL DAILY
Qty: 90 TABLET | Refills: 0 | Status: SHIPPED | OUTPATIENT
Start: 2024-02-19 | End: 2024-05-24

## 2024-05-20 ENCOUNTER — LAB (OUTPATIENT)
Dept: LAB | Facility: CLINIC | Age: 44
End: 2024-05-20
Payer: COMMERCIAL

## 2024-05-20 DIAGNOSIS — E78.2 MIXED HYPERLIPIDEMIA: ICD-10-CM

## 2024-05-20 LAB
ALBUMIN SERPL BCG-MCNC: 4.4 G/DL (ref 3.5–5.2)
ALP SERPL-CCNC: 55 U/L (ref 40–150)
ALT SERPL W P-5'-P-CCNC: 15 U/L (ref 0–50)
ANION GAP SERPL CALCULATED.3IONS-SCNC: 9 MMOL/L (ref 7–15)
AST SERPL W P-5'-P-CCNC: 16 U/L (ref 0–45)
BILIRUB SERPL-MCNC: 0.2 MG/DL
BUN SERPL-MCNC: 15.7 MG/DL (ref 6–20)
CALCIUM SERPL-MCNC: 9.2 MG/DL (ref 8.6–10)
CHLORIDE SERPL-SCNC: 107 MMOL/L (ref 98–107)
CHOLEST SERPL-MCNC: 204 MG/DL
CREAT SERPL-MCNC: 0.52 MG/DL (ref 0.51–0.95)
DEPRECATED HCO3 PLAS-SCNC: 22 MMOL/L (ref 22–29)
EGFRCR SERPLBLD CKD-EPI 2021: >90 ML/MIN/1.73M2
FASTING STATUS PATIENT QL REPORTED: YES
FASTING STATUS PATIENT QL REPORTED: YES
GLUCOSE SERPL-MCNC: 107 MG/DL (ref 70–99)
HDLC SERPL-MCNC: 54 MG/DL
LDLC SERPL CALC-MCNC: 140 MG/DL
NONHDLC SERPL-MCNC: 150 MG/DL
POTASSIUM SERPL-SCNC: 4.3 MMOL/L (ref 3.4–5.3)
PROT SERPL-MCNC: 7.5 G/DL (ref 6.4–8.3)
SODIUM SERPL-SCNC: 138 MMOL/L (ref 135–145)
TRIGL SERPL-MCNC: 52 MG/DL

## 2024-05-20 PROCEDURE — 36415 COLL VENOUS BLD VENIPUNCTURE: CPT

## 2024-05-20 PROCEDURE — 80061 LIPID PANEL: CPT

## 2024-05-20 PROCEDURE — 80053 COMPREHEN METABOLIC PANEL: CPT

## 2024-05-24 DIAGNOSIS — E78.2 MIXED HYPERLIPIDEMIA: ICD-10-CM

## 2024-05-24 RX ORDER — ROSUVASTATIN CALCIUM 20 MG/1
20 TABLET, COATED ORAL DAILY
Qty: 90 TABLET | Refills: 3 | Status: SHIPPED | OUTPATIENT
Start: 2024-05-24

## 2024-07-13 ENCOUNTER — HEALTH MAINTENANCE LETTER (OUTPATIENT)
Age: 44
End: 2024-07-13

## 2024-09-04 ENCOUNTER — PATIENT OUTREACH (OUTPATIENT)
Dept: CARE COORDINATION | Facility: CLINIC | Age: 44
End: 2024-09-04
Payer: COMMERCIAL

## 2025-01-08 ENCOUNTER — PATIENT OUTREACH (OUTPATIENT)
Dept: CARE COORDINATION | Facility: CLINIC | Age: 45
End: 2025-01-08
Payer: COMMERCIAL

## 2025-02-25 ENCOUNTER — APPOINTMENT (OUTPATIENT)
Dept: INTERPRETER SERVICES | Facility: CLINIC | Age: 45
End: 2025-02-25
Payer: COMMERCIAL

## 2025-02-27 ENCOUNTER — OFFICE VISIT (OUTPATIENT)
Dept: FAMILY MEDICINE | Facility: CLINIC | Age: 45
End: 2025-02-27
Attending: PHYSICIAN ASSISTANT
Payer: COMMERCIAL

## 2025-02-27 VITALS
HEIGHT: 60 IN | WEIGHT: 129.2 LBS | RESPIRATION RATE: 14 BRPM | HEART RATE: 70 BPM | OXYGEN SATURATION: 99 % | SYSTOLIC BLOOD PRESSURE: 127 MMHG | TEMPERATURE: 98.1 F | DIASTOLIC BLOOD PRESSURE: 64 MMHG | BODY MASS INDEX: 25.36 KG/M2

## 2025-02-27 DIAGNOSIS — Z00.00 ROUTINE GENERAL MEDICAL EXAMINATION AT A HEALTH CARE FACILITY: Primary | ICD-10-CM

## 2025-02-27 DIAGNOSIS — E78.2 MIXED HYPERLIPIDEMIA: ICD-10-CM

## 2025-02-27 DIAGNOSIS — R12 HEARTBURN: ICD-10-CM

## 2025-02-27 DIAGNOSIS — Z12.31 VISIT FOR SCREENING MAMMOGRAM: ICD-10-CM

## 2025-02-27 LAB
ALT SERPL W P-5'-P-CCNC: 25 U/L (ref 0–50)
CHOLEST SERPL-MCNC: 238 MG/DL
FASTING STATUS PATIENT QL REPORTED: YES
FASTING STATUS PATIENT QL REPORTED: YES
GLUCOSE SERPL-MCNC: 104 MG/DL (ref 70–99)
HDLC SERPL-MCNC: 55 MG/DL
LDLC SERPL CALC-MCNC: 170 MG/DL
NONHDLC SERPL-MCNC: 183 MG/DL
TRIGL SERPL-MCNC: 65 MG/DL

## 2025-02-27 RX ORDER — ROSUVASTATIN CALCIUM 20 MG/1
20 TABLET, COATED ORAL DAILY
Qty: 90 TABLET | Refills: 3 | Status: SHIPPED | OUTPATIENT
Start: 2025-02-27

## 2025-02-27 SDOH — HEALTH STABILITY: PHYSICAL HEALTH: ON AVERAGE, HOW MANY DAYS PER WEEK DO YOU ENGAGE IN MODERATE TO STRENUOUS EXERCISE (LIKE A BRISK WALK)?: 7 DAYS

## 2025-02-27 SDOH — HEALTH STABILITY: PHYSICAL HEALTH: ON AVERAGE, HOW MANY MINUTES DO YOU ENGAGE IN EXERCISE AT THIS LEVEL?: 20 MIN

## 2025-02-27 ASSESSMENT — PAIN SCALES - GENERAL: PAINLEVEL_OUTOF10: NO PAIN (0)

## 2025-02-27 ASSESSMENT — SOCIAL DETERMINANTS OF HEALTH (SDOH): HOW OFTEN DO YOU GET TOGETHER WITH FRIENDS OR RELATIVES?: MORE THAN THREE TIMES A WEEK

## 2025-02-27 NOTE — PROGRESS NOTES
Preventive Care Visit  [facility]  Roe Gibson PA-C, Family Medicine  Feb 27, 2025      Assessment & Plan     Routine general medical examination at a health care facility  Reviewed personal and family history. Reviewed age appropriate screenings. Reviewed healthy BP and BMI ranges. Counseled on lifestyle modifications for optimal mental and physical health.  Discussed age-appropriate health maintanence. Recommended any needed vaccinations. Continue to focus on well balanced diet and exercise     - Lipid panel reflex to direct LDL Fasting; Future  - Glucose; Future  - Lipid panel reflex to direct LDL Fasting  - Glucose    Visit for screening mammogram    - MA Screening Bilateral w/ Nate; Future    Mixed hyperlipidemia  Check labs, refilling Rx.  - Lipid panel reflex to direct LDL Fasting; Future  - ALT; Future  - rosuvastatin (CRESTOR) 20 MG tablet; Take 1 tablet (20 mg) by mouth daily.  - Lipid panel reflex to direct LDL Fasting  - ALT    Heartburn  Discussed avoiding foods that are bothering her.  Try OTC Tums or Pepcid.  Follow up if not improving.      The longitudinal plan of care for the diagnosis(es)/condition(s) as documented were addressed during this visit. Due to the added complexity in care, I will continue to support Thi in the subsequent management and with ongoing continuity of care.        Subjective   Thi is a 44 year old, presenting for the following:  Physical           HPI    Patient here for physical today.    Hyperlipidemia Follow-Up    Are you regularly taking any medication or supplement to lower your cholesterol?   Yes- crestor  Are you having muscle aches or other side effects that you think could be caused by your cholesterol lowering medication?  No      With eating spicy, greasy, acidy foods getting pain in stomach lately  Trying tonya, honey that helps      Health Care Directive  Patient does not have a Health Care Directive: Discussed advance care  planning with patient; information given to patient to review.      2/15/2024   General Health   How would you rate your overall physical health? Good   Feel stress (tense, anxious, or unable to sleep) Only a little         2/15/2024   Nutrition   Three or more servings of calcium each day? Yes   Diet: Regular (no restrictions)   How many servings of fruit and vegetables per day? 4 or more   How many sweetened beverages each day? 0-1         2/15/2024   Exercise   Days per week of moderate/strenous exercise 3 days   Average minutes spent exercising at this level 30 min         2/15/2024   Social Factors   Frequency of gathering with friends or relatives Three times a week   Worry food won't last until get money to buy more No   Food not last or not have enough money for food? No   Do you have housing? (Housing is defined as stable permanent housing and does not include staying ouside in a car, in a tent, in an abandoned building, in an overnight shelter, or couch-surfing.) Yes   Are you worried about losing your housing? No   Lack of transportation? No   Unable to get utilities (heat,electricity)? No         2/15/2024   Dental   Dentist two times every year? Yes         2/15/2024   TB Screening   Were you born outside of the [place]? (!) YES             Today's PHQ-2 Score:       2/27/2025     9:00 AM   PHQ-2 ( 1999 Pfizer)   Q1: Little interest or pleasure in doing things 0   Q2: Feeling down, depressed or hopeless 0   PHQ-2 Score 0         2/15/2024   Substance Use   Alcohol more than 3/day or more than 7/wk Not Applicable   Do you use any other substances recreationally? No     Social History     Tobacco Use    Smoking status: Never    Smokeless tobacco: Never   Vaping Use    Vaping status: Never Used   Substance Use Topics    Alcohol use: No     Alcohol/week: 0.0 standard drinks of alcohol    Drug use: No           2/15/2024   LAST FHS-7 RESULTS   1st degree relative breast or ovarian cancer Yes    Any relative  "bilateral breast cancer No    Any male have breast cancer No    Any ONE woman have BOTH breast AND ovarian cancer Unknown    Any woman with breast cancer before 50yrs Unknown    2 or more relatives with breast AND/OR ovarian cancer Unknown    2 or more relatives with breast AND/OR bowel cancer No        Proxy-reported        Mammogram Screening - Mammogram every 1-2 years updated in Health Maintenance based on mutual decision making      History of abnormal Pap smear: No - age 30- 64 PAP with HPV every 5 years recommended        Latest Ref Rng & Units 2/7/2023    10:40 AM 2/23/2018     3:53 PM 2/23/2018     1:12 PM   PAP / HPV   PAP  Negative for Intraepithelial Lesion or Malignancy (NILM)      PAP (Historical)    NIL    HPV 16 DNA Negative Negative  Negative     HPV 18 DNA Negative Negative  Negative     Other HR HPV Negative Negative  Negative       ASCVD Risk   The 10-year ASCVD risk score (Zoila DEMPSEY, et al., 2019) is: 0.8%    Values used to calculate the score:      Age: 44 years      Sex: Female      Is Non- : No      Diabetic: No      Tobacco smoker: No      Systolic Blood Pressure: 127 mmHg      Is BP treated: No      HDL Cholesterol: 54 mg/dL      Total Cholesterol: 204 mg/dL        2/15/2024   Contraception/Family Planning   Questions about contraception or family planning No        Reviewed and updated as needed this visit by Provider                          Review of Systems  Constitutional, HEENT, cardiovascular, pulmonary, gi and gu systems are negative, except as otherwise noted.     Objective    Exam  /64 (BP Location: Right arm, Patient Position: Sitting, Cuff Size: Adult Regular)   Pulse 70   Temp 98.1  F (36.7  C) (Oral)   Resp 14   Ht 1.534 m (5' 0.4\")   Wt 58.6 kg (129 lb 3.2 oz)   LMP 02/21/2025 (Exact Date)   SpO2 99%   BMI 24.90 kg/m     Estimated body mass index is 24.9 kg/m  as calculated from the following:    Height as of this encounter: " "1.534 m (5' 0.4\").    Weight as of this encounter: 58.6 kg (129 lb 3.2 oz).    Physical Exam  GENERAL: alert and no distress  EYES: Eyes grossly normal to inspection, PERRL and conjunctivae and sclerae normal  HENT: ear canals and TM's normal, nose and mouth without ulcers or lesions  NECK: no adenopathy, no asymmetry, masses, or scars  RESP: lungs clear to auscultation - no rales, rhonchi or wheezes  CV: regular rate and rhythm, normal S1 S2, no S3 or S4, no murmur, click or rub, no peripheral edema  ABDOMEN: soft, nontender, no hepatosplenomegaly, no masses and bowel sounds normal  MS: no gross musculoskeletal defects noted, no edema  SKIN: no suspicious lesions or rashes  NEURO: Normal strength and tone, mentation intact and speech normal  PSYCH: mentation appears normal, affect normal/bright        Signed Electronically by: Roe Gibson PA-C    "

## 2025-02-27 NOTE — PROGRESS NOTES
Prior to immunization administration, verified patients identity using patient s name and date of birth. Please see Immunization Activity for additional information.     Screening Questionnaire for Adult Immunization    Are you sick today?   No   Do you have allergies to medications, food, a vaccine component or latex?   No   Have you ever had a serious reaction after receiving a vaccination?   No   Do you have a long-term health problem with heart, lung, kidney, or metabolic disease (e.g., diabetes), asthma, a blood disorder, no spleen, complement component deficiency, a cochlear implant, or a spinal fluid leak?  Are you on long-term aspirin therapy?   No   Do you have cancer, leukemia, HIV/AIDS, or any other immune system problem?   No   Do you have a parent, brother, or sister with an immune system problem?   No   In the past 3 months, have you taken medications that affect  your immune system, such as prednisone, other steroids, or anticancer drugs; drugs for the treatment of rheumatoid arthritis, Crohn s disease, or psoriasis; or have you had radiation treatments?   No   Have you had a seizure, or a brain or other nervous system problem?   No   During the past year, have you received a transfusion of blood or blood    products, or been given immune (gamma) globulin or antiviral drug?   No   For women: Are you pregnant or is there a chance you could become       pregnant during the next month?   No   Have you received any vaccinations in the past 4 weeks?   No     Immunization questionnaire answers were all negative.      Patient instructed to remain in clinic for 15 minutes afterwards, and to report any adverse reactions.     Screening performed by Keren Sargent MA on 2/27/2025 at 9:34 AM.

## 2025-02-27 NOTE — PATIENT INSTRUCTIONS
"Patient Education   L?i Khuyên Precious Sóc D? Phòng  Ðây là l?i bernadine hobson tôi thu?ng susan ra d? giúp m?i ann-marie?i kh?e m?nh. Nhóm precious sóc c?a quý v? có th? có l?i khuyên c? th? dành riêng cho quý v?. Vui lòng trao d?i v?i nhóm precious sóc v? rito c?u precious sóc d? phòng c?a chính quý v?.  L?i s?ng  T?p th? d?c ít nh?t 150 phút m?i tu?n (30 phút m?i ngày, 5 ngày m?t tu?n).  Th?c hi?n các ho?t d?ng martinez cu?ng co 2 ngày m?t tu?n. Ði?u này s? giúp quý v? ki?m soát cân n?ng và jing ng?a b?nh t?t.  Không hút thu?c.  Thoa corie ch?ng n?ng d? jing ng?a susanna thu da.  Ki?m tra m?c d? radon radha nhà t? 2 d?n 5 nam m?t l?n. Radon là m?t lo?i khí không màu, không mùi có th? gây h?i cho ph?i c?a quý v?. Ð? tìm hi?u thêm, hãy truy c?p [email or web address]. và tìm ki?m \"Radon in Homes\" (Radon radha nhà).  Gi? súng không n?p d?n và khóa l?i d? ? antonio an toàn rito két s?t ho?c h?m ch?a súng, ho?c s? d?ng khóa súng và c?t chìa khóa di. Luôn khóa và c?t d?n ? ch? riêng. Ð? tìm hi?u thêm, hãy truy c?p dps.mn.[facility] và tìm ki?m \"safe gun storage\" (c?t gi? súng an toàn).  Berto du?ng  An ít nh?t 5 kh?u ph?n trái cây và bay qu? m?i ngày.  Hãy th? bánh mì lúa mì, g?o l?t và mì ?ng nguyên h?t (thay vì bánh mì tr?ng, g?o và mì ?ng).  N?p d? canxi và vitamin D. Ki?m tra nhãn trên th?c ph?m và hu?ng t?i 100% yajaira RDA (m?c tiêu th? hàng ngày du?c khuy?n ngh?).  Khám d?nh k?  Khám và v? sinh rang mi?ng 6 tháng m?t l?n.  G?p nhóm precious sóc s?c kh?e c?a quý v? hàng namd? trao d?i v?:  B?t k? thay d?i nào v? s?c kh?e c?a quý v?.  B?t k? lo?i thu?c nào mà nhóm precious sóc c?a quý v? dã kê don.  Precious sóc d? phòng, k? ho?ch hóa jeffery dình và cách jing ng?a các b?nh mãn tính.  Tiêm ch?ng (v?c-eladio)   Tiêm phòng HPV (d?n 26 tu?i), n?u quý v? norma t?ng tiêm lo?i v?c-eladio này terrence?c dó.  Tiêm phòng viêm alina B (d?n 59 tu?i), n?u quý v? norma t?ng tiêm lo?i v?c-eladio này terrence?c dó.  Tiêm phòng COVID-19: Tiêm v?c-eladio này khi d?n h?n.  Tiêm phòng cúm: Tiêm phòng cúm hàng nam.  Tiêm " phòng u?n ván: Tiêm phòng u?n ván 10 nam m?t l?n.  Tiêm phòng ph? c?u khu?n, viêm alina A và RSV: Hãy h?i nhóm sharee sóc c?a quý v? xem li?u quý v? có c?n nh?ng yolanda tiêm này hay không d?a trên nguy co emani?m b?nh c?a quý v?.  Tiêm phòng Loulou th?n kinh (dành cho tu?i t? 50 tr? lên).  Xét holly?m s?c kh?e t?ng quát  Sàng l?c b?nh ti?u du?ng:  B?t d?u t? tu?i 35, Khám sàng l?c b?nh ti?u du?ng ít nh?t 3 nam m?t l?n.  N?u quý v? du?i 35 tu?i, hãy h?i nhóm sharee sóc xem quý v? có nên sàng l?c b?nh ti?u du?ng hay không.  Xét holly?m cholesterol: T? tu?i 39, b?t d?u xét holly?m cholesterol 5 nam m?t l?n, ho?c thu?ng xuyên hon n?u du?c khuy?n ngh?.  Ðo m?t d? xuong (DEXA): ? tu?i 50, hãy h?i nhóm sharee sóc c?a quý v? xem quý v? có nên th?c hi?n xét holly?m này d? phát hi?n b?nh loãng xuong (xuong giòn) hay không.  Viêm alina C: Ði xét holly?m ít nh?t m?t l?n radha d?i.  Khám sàng l?c phình d?ng m?ch ch? b?ng: Trao d?i v?i bác si c?a quý v? v? vi?c th?c hi?n sàng l?c này n?u quý v?:  Ðã t?ng hút thu?c; và  Là nam gi?i v? m?t sinh h?c; và  Radha d? tu?i t? 65 d?n 75.  STI (b?nh emani?m trùng guy du?ng tình d?c)  Curt?c 24 tu?i: Hãy h?i nhóm sharee sóc c?a quý v? xem quý v? có nên khám sàng l?c STI hay không.  Shirin 24 tu?i: Sàng l?c STI n?u quý v? có nguy co m?c b?nh. Quý v? có nguy co m?c các b?nh STI (laura g?m c? HIV) n?u:  Quý v? có michele h? tình d?c tich c?c v?i hon m?t ann-marie?i.  Quý v? không s? d?ng laura oden conn m?i lúc.  Quý v? ho?c b?n tình du?c ch?n doán m?c b?nh emani?m b?nh lây guy du?ng tình d?c.  N?u quý v? có nguy co emani?m HIV, hãy h?i monie thu?c PrEP d? jing ng?a HIV.  Ði xét holly?m HIV ít nh?t m?t l?n radha d?i, cho dù quý v? có nguy co emani?m HIV hay không.  Xét holly?m sàng l?c susanna thu  Sàng l?c susanna thu c? t? cung: N?u quý v? có c? t? cung, hãy b?t d?u làm xét holly?m sàng l?c susanna thu c? t? cung thu?ng xuyên t? tu?i 21. H?u h?t nh?ng ann-marie?i khám sàng l?c thu?ng xuyên v?i k?t qu? bình thu?ng d?u có th? ng?ng khám shirin 65 tu?i. Hãy trao d?i v?  v?n d? này v?i bác si c?a quý v?.  Quét susanna thu vú (ch?p edgardo susan?n vú): N?u quý v? có hay t?ng có vú, hãy b?t d?u ch?p edgardo susan?n vú thu?ng xuyên b?t d?u t? tu?i 40. Ðây là quá trình quét d? ki?m tra susanna thu vú.  Sàng l?c susanna thu d?i tràng: C?n b?t d?u sàng l?c susanna thu d?i tràng t? tu?i 45.  Th?c hi?n xét holly?m n?i soi d?i tràng 10 nam m?t l?n (ho?c thu?ng xuyên hon n?u quý v? có nguy co m?c b?nh) Ho?c, hãy h?i nhà cung c?p c?a quý v? v? các xét holly?m phân rito xét holly?m FIT hàng nam ho?c xét holly?m [employer] 3 nam m?t l?n.  Ð? tìm hi?u thêm v? các tùy ch?n th? holly?m c?a quý v?, hãy truy c?p: [email or web address].  Ð? du?c h? tr? susan ra yesi?t d?nh, hãy truy c?p: [email or web address].  Xét holly?m sàng l?c susanna thu susan?n ti?n li?t: N?u quý v? có susan?n ti?n li?t và ? d? tu?i t? 55 d?n 69, hãy h?i bác si xem vi?c xét holly?m sàng l?c susanna thu susan?n ti?n li?t hàng nam có dem l?i l?i ích gì cho quý v? hay không.  Sàng l?c susanna thu ph?i: N?u quý v? dã t?ng ho?c còn villa hút thu?c và t? 50 d?n 80 tu?i, hãy h?i nhóm sharee sóc c?a quý v? xem vi?c sàng l?c susanna thu ph?i thu?ng xuyên có phù h?p v?i quý v? hay không.    Ch? nh?m m?c dích moose kh?o. Không th? thay th? l?i khuyên c?a nhà cung c?p d?ch v? sharee sóc s?c kh?e c?a quý v?. B?n yesi?n   2023 [facility].   B?o dion m?i yesi?n. Ðu?c dánh giá lâm sàng b?i [facility]. [employer] [id number] - REV 04/24.

## 2025-03-10 ENCOUNTER — TELEPHONE (OUTPATIENT)
Dept: FAMILY MEDICINE | Facility: CLINIC | Age: 45
End: 2025-03-10
Payer: COMMERCIAL

## 2025-03-10 ENCOUNTER — VIRTUAL VISIT (OUTPATIENT)
Dept: INTERPRETER SERVICES | Facility: CLINIC | Age: 45
End: 2025-03-10
Payer: COMMERCIAL

## 2025-03-10 DIAGNOSIS — E78.2 MIXED HYPERLIPIDEMIA: Primary | ICD-10-CM

## 2025-03-10 NOTE — TELEPHONE ENCOUNTER
Called via interpretor - ID [id number].    Advised of below.  Phone number given in case she does not hear from anyone from Cardiology.

## 2025-03-10 NOTE — TELEPHONE ENCOUNTER
So actually I am just going to do a referral to cardiology.  Please let her know that someone should call to help schedule this appointment and they will discuss if any further labs are needed or medication changes.      Thanks!  Roe

## 2025-03-10 NOTE — TELEPHONE ENCOUNTER
RN called patient with [facility] .     Reviewed results and provider message with patient. She is taking her cholesterol medication daily. Patient reports her mom does have high cholesterol. Patient is willing to do additional lab work.     Cheri Reina RN 3/10/2025 8:33 AM  [facility]

## 2025-03-10 NOTE — TELEPHONE ENCOUNTER
----- Message from Roe Gibson sent at 3/9/2025  9:35 AM CDT -----  Please call Thi.  Is she taking her cholesterol medication?  LDL cholesterol still pretty high.  I am wondering if I should add some labs looking for familial or genetic high cholesterol.    Let me know if she is taking her medication daily.    Thanks!

## 2025-03-18 ENCOUNTER — ANCILLARY PROCEDURE (OUTPATIENT)
Dept: MAMMOGRAPHY | Facility: CLINIC | Age: 45
End: 2025-03-18
Attending: PHYSICIAN ASSISTANT
Payer: COMMERCIAL

## 2025-03-18 DIAGNOSIS — Z12.31 VISIT FOR SCREENING MAMMOGRAM: ICD-10-CM

## 2025-03-18 PROCEDURE — 77063 BREAST TOMOSYNTHESIS BI: CPT | Mod: TC | Performed by: RADIOLOGY

## 2025-03-18 PROCEDURE — 77067 SCR MAMMO BI INCL CAD: CPT | Mod: TC | Performed by: RADIOLOGY

## 2025-05-07 ENCOUNTER — OFFICE VISIT (OUTPATIENT)
Dept: CARDIOLOGY | Facility: CLINIC | Age: 45
End: 2025-05-07
Attending: PHYSICIAN ASSISTANT
Payer: COMMERCIAL

## 2025-05-07 VITALS
SYSTOLIC BLOOD PRESSURE: 118 MMHG | WEIGHT: 130.7 LBS | DIASTOLIC BLOOD PRESSURE: 60 MMHG | OXYGEN SATURATION: 99 % | HEART RATE: 94 BPM | BODY MASS INDEX: 25.66 KG/M2 | HEIGHT: 60 IN

## 2025-05-07 DIAGNOSIS — E78.01 FAMILIAL HYPERCHOLESTEREMIA: ICD-10-CM

## 2025-05-07 DIAGNOSIS — R06.09 DYSPNEA ON EXERTION: ICD-10-CM

## 2025-05-07 DIAGNOSIS — R07.89 ATYPICAL CHEST PAIN: ICD-10-CM

## 2025-05-07 DIAGNOSIS — Z91.89 MULTIPLE RISK FACTORS FOR CORONARY ARTERY DISEASE: ICD-10-CM

## 2025-05-07 DIAGNOSIS — Z13.6 SCREENING FOR HEART DISEASE: Primary | ICD-10-CM

## 2025-05-07 DIAGNOSIS — E78.2 MIXED HYPERLIPIDEMIA: ICD-10-CM

## 2025-05-07 DIAGNOSIS — R94.31 ABNORMAL ELECTROCARDIOGRAM: ICD-10-CM

## 2025-05-07 RX ORDER — ROSUVASTATIN CALCIUM 40 MG/1
40 TABLET, COATED ORAL AT BEDTIME
Qty: 90 TABLET | Refills: 3 | Status: SHIPPED | OUTPATIENT
Start: 2025-05-07

## 2025-05-07 NOTE — LETTER
5/7/2025    Roe Gibson PA-C  [address]  [facility] [address]    RE: Katrina Barry       Dear Colleague,     I had the pleasure of seeing Katrina Barry in the [facility].      Cardiology Clinic Consultation:    May 7, 2025   Patient Name: Katrina Barry  Patient MRN: [id number]    Consult indication: chest pain     HPI:    I had the opportunity to see patient Katrina Barry in cardiology clinic for a consultation. Patient is followed by our colleague Roe Gibson PA-C with Primary Care.     Encounter done with a licensed Niuean .     As you know patient is a pleasant 44-year-old female with a past medical history significant for dyslipidemia who presents for further evaluation and management of chest pain.    Patient has a longstanding history of dyslipidemia, now on rosuvastatin 20 mg daily, with persistently elevated LDL, personally reviewed lipid profile from 2023, 2024, and most recently from 2/27/2025 showing .  She adheres to a generally well-balanced diet, avoiding excessive carbohydrates, she does not drink alcohol.  Family history is somewhat unclear, though she reports no family history of early ASCVD in first-degree relatives.    Over the past several months patient has had intermittent right-sided chest discomfort, this is nonexertional in nature and actually seems to be improved with movement.  She also notes stiffness in her right leg and right shoulder sometimes associated with this chest discomfort.  Episodes last for several hours at a time, improved with walking, and stretching.  ECG today in clinic demonstrates sinus rhythm at 86 bpm, nonspecific ST segment changes.    Assessment and Plan/Recommendations:    # Dyslipidemia, possible FH   # Atypical chest pain    -Her symptoms do not seem classically characteristic of myocardial ischemia however she does have risk factors including possible familial hypercholesterolemia, family history unclear.  ECG  with nonspecific ST segment changes, as such, exercise stress echocardiogram is warranted.  - Will increase rosuvastatin to 40 mg nightly, caution of possible side effects, particularly since she is of East  ethnicity.  If she has adverse side effects from this, then would recommend decreasing this back to 20 mg nightly, and adding ezetimibe, ultimately may require PCSK9 inhibitor therapy  - Discussed referral to [facility], she would like to consider this further  - Follow-up labs in about 3 months with cardiology NEELAM follow-up at that time, or sooner as needed    Thank you for allowing our team to participate in the care of Katrina Barry.  Please do not hesitate to call or page me with any questions or concerns.    Sincerely,     Shaq Ramos MD, [facility]  Cardiology  May 7, 2025    cc  Roe Gibson PA-C  [address] [place] [address]    Voice recognition software utilized.       Past Medical History:     Patient Active Problem List   Diagnosis     Seasonal allergic rhinitis due to other allergic trigger     Mixed hyperlipidemia     Insertion of ParaGard IUD : Due form removal 2032     IUD (intrauterine device) in place       Past Surgical History:   History reviewed. No pertinent surgical history.    Medications (outpatient):  Current Outpatient Medications   Medication Sig Dispense Refill     paragard intrauterine copper device 1 each by Intrauterine route once       rosuvastatin (CRESTOR) 40 MG tablet Take 1 tablet (40 mg) by mouth at bedtime. 90 tablet 3       Allergies:  Allergies   Allergen Reactions     Soy Allergy (Obsolete)      rash       Social History:   History   Drug Use No      History   Smoking Status     Never   Smokeless Tobacco     Never     Social History    Substance and Sexual Activity      Alcohol use: No        Alcohol/week: 0.0 standard drinks of alcohol       Family History:  Family History   Problem Relation Age of Onset     Other Cancer Mother       "   colon     Other Cancer Father         lung       Review of Systems:   A comprehensive 12 system review of systems was carried out.  Pertinent positives and negatives are noted above. Otherwise negative for contributory information.    Objective & Physical Exam:  /60   Pulse 94   Ht 1.534 m (5' 0.4\")   Wt 59.3 kg (130 lb 11.2 oz)   SpO2 99%   BMI 25.19 kg/m    Wt Readings from Last 2 Encounters:   05/07/25 59.3 kg (130 lb 11.2 oz)   02/27/25 58.6 kg (129 lb 3.2 oz)     Body mass index is 25.19 kg/m .   Body surface area is 1.59 meters squared.    Constitutional: appears stated age, in no apparent distress, appears to be well nourished  Pulmonary: clear to auscultation bilaterally, no wheezes, no rales, no increased work of breathing  Cardiovascular: JVP normal, regular rate, regular rhythm, no murmur appreciated, no lower extremity edema    Data reviewed:  Lab Results   Component Value Date    WBC 4.8 02/15/2024    WBC 6.4 09/16/2020    RBC 5.80 (H) 02/15/2024    RBC 5.82 (H) 09/16/2020    HGB 12.5 02/15/2024    HGB 12.4 09/16/2020    HCT 40.5 02/15/2024    HCT 37.8 09/16/2020    MCV 70 (L) 02/15/2024    MCV 65 (L) 09/16/2020    MCH 21.6 (L) 02/15/2024    MCH 21.3 (L) 09/16/2020    MCHC 30.9 (L) 02/15/2024    MCHC 32.8 09/16/2020    RDW 15.9 (H) 02/15/2024    RDW 16.2 (H) 09/16/2020     02/15/2024     09/16/2020     Sodium   Date Value Ref Range Status   05/20/2024 138 135 - 145 mmol/L Final     Comment:     Reference intervals for this test were updated on 09/26/2023 to more accurately reflect our healthy population. There may be differences in the flagging of prior results with similar values performed with this method. Interpretation of those prior results can be made in the context of the updated reference intervals.    09/16/2020 138 133 - 144 mmol/L Final     Potassium   Date Value Ref Range Status   05/20/2024 4.3 3.4 - 5.3 mmol/L Final   12/07/2021 4.0 3.4 - 5.3 mmol/L Final "   09/16/2020 4.0 3.4 - 5.3 mmol/L Final     Chloride   Date Value Ref Range Status   05/20/2024 107 98 - 107 mmol/L Final   12/07/2021 106 94 - 109 mmol/L Final   09/16/2020 107 94 - 109 mmol/L Final     Carbon Dioxide   Date Value Ref Range Status   09/16/2020 24 20 - 32 mmol/L Final     Carbon Dioxide (CO2)   Date Value Ref Range Status   05/20/2024 22 22 - 29 mmol/L Final   12/07/2021 28 20 - 32 mmol/L Final     Anion Gap   Date Value Ref Range Status   05/20/2024 9 7 - 15 mmol/L Final   12/07/2021 5 3 - 14 mmol/L Final   09/16/2020 7 3 - 14 mmol/L Final     Glucose   Date Value Ref Range Status   02/27/2025 104 (H) 70 - 99 mg/dL Final   12/07/2021 99 70 - 99 mg/dL Final   09/16/2020 88 70 - 99 mg/dL Final     Urea Nitrogen   Date Value Ref Range Status   05/20/2024 15.7 6.0 - 20.0 mg/dL Final   12/07/2021 11 7 - 30 mg/dL Final   09/16/2020 13 7 - 30 mg/dL Final     Creatinine   Date Value Ref Range Status   05/20/2024 0.52 0.51 - 0.95 mg/dL Final   09/16/2020 0.58 0.52 - 1.04 mg/dL Final     GFR Estimate   Date Value Ref Range Status   05/20/2024 >90 >60 mL/min/1.73m2 Final   09/16/2020 >90 >60 mL/min/[1.73_m2] Final     Comment:     Non  GFR Calc  Starting 12/18/2018, serum creatinine based estimated GFR (eGFR) will be   calculated using the Chronic Kidney Disease Epidemiology Collaboration   (CKD-EPI) equation.       Calcium   Date Value Ref Range Status   05/20/2024 9.2 8.6 - 10.0 mg/dL Final   09/16/2020 8.8 8.5 - 10.1 mg/dL Final     Bilirubin Total   Date Value Ref Range Status   05/20/2024 0.2 <=1.2 mg/dL Final   09/16/2020 0.4 0.2 - 1.3 mg/dL Final     Alkaline Phosphatase   Date Value Ref Range Status   05/20/2024 55 40 - 150 U/L Final   09/16/2020 52 40 - 150 U/L Final     ALT   Date Value Ref Range Status   02/27/2025 25 0 - 50 U/L Final   09/16/2020 24 0 - 50 U/L Final     AST   Date Value Ref Range Status   05/20/2024 16 0 - 45 U/L Final     Comment:     Reference intervals for this  "test were updated on 6/12/2023 to more accurately reflect our healthy population. There may be differences in the flagging of prior results with similar values performed with this method. Interpretation of those prior results can be made in the context of the updated reference intervals.   09/16/2020 11 0 - 45 U/L Final     Recent Labs   Lab Test 02/27/25  0943 05/20/24  0854   CHOL 238* 204*   HDL 55 54   * 140*   TRIG 65 52      No results found for: \"A1C\"     No results found for this or any previous visit (from the past 4320 hours).     Thank you for allowing me to participate in the care of your patient.      Sincerely,     Shaq Ramos MD     [facility]  cc:   Roe Gibson PA-C  [address] [place] [address]      "

## 2025-05-07 NOTE — PROGRESS NOTES
Cardiology Clinic Consultation:    May 7, 2025   Patient Name: Katrina Barry  Patient MRN: [id number]    Consult indication: chest pain     HPI:    I had the opportunity to see patient Katrina Barry in cardiology clinic for a consultation. Patient is followed by our colleague Roe Gibson PA-C with Primary Care.     Encounter done with a licensed [place]ian .     As you know patient is a pleasant 44-year-old female with a past medical history significant for dyslipidemia who presents for further evaluation and management of chest pain.    Patient has a longstanding history of dyslipidemia, now on rosuvastatin 20 mg daily, with persistently elevated LDL, personally reviewed lipid profile from 2023, 2024, and most recently from 2/27/2025 showing .  She adheres to a generally well-balanced diet, avoiding excessive carbohydrates, she does not drink alcohol.  Family history is somewhat unclear, though she reports no family history of early ASCVD in first-degree relatives.    Over the past several months patient has had intermittent right-sided chest discomfort, this is nonexertional in nature and actually seems to be improved with movement.  She also notes stiffness in her right leg and right shoulder sometimes associated with this chest discomfort.  Episodes last for several hours at a time, improved with walking, and stretching.  ECG today in clinic demonstrates sinus rhythm at 86 bpm, nonspecific ST segment changes.    Assessment and Plan/Recommendations:    # Dyslipidemia, possible FH   # Atypical chest pain    -Her symptoms do not seem classically characteristic of myocardial ischemia however she does have risk factors including possible familial hypercholesterolemia, family history unclear.  ECG with nonspecific ST segment changes, as such, exercise stress echocardiogram is warranted.  - Will increase rosuvastatin to 40 mg nightly, caution of possible side effects, particularly since she is  of East  ethnicity.  If she has adverse side effects from this, then would recommend decreasing this back to 20 mg nightly, and adding ezetimibe, ultimately may require PCSK9 inhibitor therapy  - Discussed referral to [facility], she would like to consider this further  - Follow-up labs in about 3 months with cardiology NEELAM follow-up at that time, or sooner as needed    Thank you for allowing our team to participate in the care of Katrina Barry.  Please do not hesitate to call or page me with any questions or concerns.    Sincerely,     Shaq Ramos MD, [facility]  Cardiology  May 7, 2025    cc  Roe Gibson PA-C  [address] [place] [address]    Voice recognition software utilized.       Past Medical History:     Patient Active Problem List   Diagnosis    Seasonal allergic rhinitis due to other allergic trigger    Mixed hyperlipidemia    Insertion of ParaGard IUD : Due form removal 2032    IUD (intrauterine device) in place       Past Surgical History:   History reviewed. No pertinent surgical history.    Medications (outpatient):  Current Outpatient Medications   Medication Sig Dispense Refill    paragard intrauterine copper device 1 each by Intrauterine route once      rosuvastatin (CRESTOR) 40 MG tablet Take 1 tablet (40 mg) by mouth at bedtime. 90 tablet 3       Allergies:  Allergies   Allergen Reactions    Soy Allergy (Obsolete)      rash       Social History:   History   Drug Use No      History   Smoking Status    Never   Smokeless Tobacco    Never     Social History    Substance and Sexual Activity      Alcohol use: No        Alcohol/week: 0.0 standard drinks of alcohol       Family History:  Family History   Problem Relation Age of Onset    Other Cancer Mother         colon    Other Cancer Father         lung       Review of Systems:   A comprehensive 12 system review of systems was carried out.  Pertinent positives and negatives are noted above. Otherwise negative for  "contributory information.    Objective & Physical Exam:  /60   Pulse 94   Ht 1.534 m (5' 0.4\")   Wt 59.3 kg (130 lb 11.2 oz)   SpO2 99%   BMI 25.19 kg/m    Wt Readings from Last 2 Encounters:   05/07/25 59.3 kg (130 lb 11.2 oz)   02/27/25 58.6 kg (129 lb 3.2 oz)     Body mass index is 25.19 kg/m .   Body surface area is 1.59 meters squared.    Constitutional: appears stated age, in no apparent distress, appears to be well nourished  Pulmonary: clear to auscultation bilaterally, no wheezes, no rales, no increased work of breathing  Cardiovascular: JVP normal, regular rate, regular rhythm, no murmur appreciated, no lower extremity edema    Data reviewed:  Lab Results   Component Value Date    WBC 4.8 02/15/2024    WBC 6.4 09/16/2020    RBC 5.80 (H) 02/15/2024    RBC 5.82 (H) 09/16/2020    HGB 12.5 02/15/2024    HGB 12.4 09/16/2020    HCT 40.5 02/15/2024    HCT 37.8 09/16/2020    MCV 70 (L) 02/15/2024    MCV 65 (L) 09/16/2020    MCH 21.6 (L) 02/15/2024    MCH 21.3 (L) 09/16/2020    MCHC 30.9 (L) 02/15/2024    MCHC 32.8 09/16/2020    RDW 15.9 (H) 02/15/2024    RDW 16.2 (H) 09/16/2020     02/15/2024     09/16/2020     Sodium   Date Value Ref Range Status   05/20/2024 138 135 - 145 mmol/L Final     Comment:     Reference intervals for this test were updated on 09/26/2023 to more accurately reflect our healthy population. There may be differences in the flagging of prior results with similar values performed with this method. Interpretation of those prior results can be made in the context of the updated reference intervals.    09/16/2020 138 133 - 144 mmol/L Final     Potassium   Date Value Ref Range Status   05/20/2024 4.3 3.4 - 5.3 mmol/L Final   12/07/2021 4.0 3.4 - 5.3 mmol/L Final   09/16/2020 4.0 3.4 - 5.3 mmol/L Final     Chloride   Date Value Ref Range Status   05/20/2024 107 98 - 107 mmol/L Final   12/07/2021 106 94 - 109 mmol/L Final   09/16/2020 107 94 - 109 mmol/L Final     Carbon " Dioxide   Date Value Ref Range Status   09/16/2020 24 20 - 32 mmol/L Final     Carbon Dioxide (CO2)   Date Value Ref Range Status   05/20/2024 22 22 - 29 mmol/L Final   12/07/2021 28 20 - 32 mmol/L Final     Anion Gap   Date Value Ref Range Status   05/20/2024 9 7 - 15 mmol/L Final   12/07/2021 5 3 - 14 mmol/L Final   09/16/2020 7 3 - 14 mmol/L Final     Glucose   Date Value Ref Range Status   02/27/2025 104 (H) 70 - 99 mg/dL Final   12/07/2021 99 70 - 99 mg/dL Final   09/16/2020 88 70 - 99 mg/dL Final     Urea Nitrogen   Date Value Ref Range Status   05/20/2024 15.7 6.0 - 20.0 mg/dL Final   12/07/2021 11 7 - 30 mg/dL Final   09/16/2020 13 7 - 30 mg/dL Final     Creatinine   Date Value Ref Range Status   05/20/2024 0.52 0.51 - 0.95 mg/dL Final   09/16/2020 0.58 0.52 - 1.04 mg/dL Final     GFR Estimate   Date Value Ref Range Status   05/20/2024 >90 >60 mL/min/1.73m2 Final   09/16/2020 >90 >60 mL/min/[1.73_m2] Final     Comment:     Non  GFR Calc  Starting 12/18/2018, serum creatinine based estimated GFR (eGFR) will be   calculated using the Chronic Kidney Disease Epidemiology Collaboration   (CKD-EPI) equation.       Calcium   Date Value Ref Range Status   05/20/2024 9.2 8.6 - 10.0 mg/dL Final   09/16/2020 8.8 8.5 - 10.1 mg/dL Final     Bilirubin Total   Date Value Ref Range Status   05/20/2024 0.2 <=1.2 mg/dL Final   09/16/2020 0.4 0.2 - 1.3 mg/dL Final     Alkaline Phosphatase   Date Value Ref Range Status   05/20/2024 55 40 - 150 U/L Final   09/16/2020 52 40 - 150 U/L Final     ALT   Date Value Ref Range Status   02/27/2025 25 0 - 50 U/L Final   09/16/2020 24 0 - 50 U/L Final     AST   Date Value Ref Range Status   05/20/2024 16 0 - 45 U/L Final     Comment:     Reference intervals for this test were updated on 6/12/2023 to more accurately reflect our healthy population. There may be differences in the flagging of prior results with similar values performed with this method. Interpretation of those  "prior results can be made in the context of the updated reference intervals.   09/16/2020 11 0 - 45 U/L Final     Recent Labs   Lab Test 02/27/25  0943 05/20/24  0854   CHOL 238* 204*   HDL 55 54   * 140*   TRIG 65 52      No results found for: \"A1C\"     No results found for this or any previous visit (from the past 4320 hours).     "

## 2025-05-07 NOTE — PATIENT INSTRUCTIONS
May 7, 2025    Thank you for allowing our Cardiology team to participate in your care.     Please note the following changes to your heart treatment plan:     Medication changes:   - increase rosuvastatin to 40 mg at bedtime     Tests to be done:  - exercise stress echocardiogram  - labs in 3 months    Follow up:  - Follow up in 3 months, or sooner as needed      For scheduling, please call [phone]      Please contact our team through [employer] or our Nurse Team Voicemail service [phone] for questions or concerns.     General Clinic [phone]     If you are having a medical emergency, please call 911.     Sincerely,    Shaq Ramos MD, FACC  Cardiology    [facility] - [place]